# Patient Record
Sex: MALE | Race: WHITE | Employment: OTHER | URBAN - METROPOLITAN AREA
[De-identification: names, ages, dates, MRNs, and addresses within clinical notes are randomized per-mention and may not be internally consistent; named-entity substitution may affect disease eponyms.]

---

## 2018-01-01 ENCOUNTER — TELEPHONE (OUTPATIENT)
Dept: NEPHROLOGY | Facility: CLINIC | Age: 83
End: 2018-01-01

## 2018-12-07 ENCOUNTER — OFFICE VISIT (OUTPATIENT)
Dept: NEPHROLOGY | Facility: CLINIC | Age: 83
End: 2018-12-07
Payer: MEDICARE

## 2018-12-07 VITALS — BODY MASS INDEX: 30 KG/M2 | DIASTOLIC BLOOD PRESSURE: 60 MMHG | WEIGHT: 194 LBS | SYSTOLIC BLOOD PRESSURE: 140 MMHG

## 2018-12-07 DIAGNOSIS — D63.1 ANEMIA DUE TO STAGE 3 CHRONIC KIDNEY DISEASE (HCC): ICD-10-CM

## 2018-12-07 DIAGNOSIS — N18.30 CKD (CHRONIC KIDNEY DISEASE) STAGE 3, GFR 30-59 ML/MIN (HCC): Primary | ICD-10-CM

## 2018-12-07 DIAGNOSIS — I10 ESSENTIAL HYPERTENSION: ICD-10-CM

## 2018-12-07 DIAGNOSIS — E79.0 ELEVATED URIC ACID IN BLOOD: ICD-10-CM

## 2018-12-07 DIAGNOSIS — R60.9 EDEMA, UNSPECIFIED TYPE: ICD-10-CM

## 2018-12-07 DIAGNOSIS — N18.30 ANEMIA DUE TO STAGE 3 CHRONIC KIDNEY DISEASE (HCC): ICD-10-CM

## 2018-12-07 PROCEDURE — 99205 OFFICE O/P NEW HI 60 MIN: CPT | Performed by: INTERNAL MEDICINE

## 2018-12-07 RX ORDER — ALLOPURINOL 100 MG/1
100 TABLET ORAL DAILY
COMMUNITY
End: 2019-01-01

## 2018-12-07 RX ORDER — CHLORAL HYDRATE 500 MG
1000 CAPSULE ORAL DAILY
Status: ON HOLD | COMMUNITY
End: 2019-01-01

## 2018-12-07 NOTE — PROGRESS NOTES
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 He has smoked for many years but quit recently. Was seen a nephrologist in [place]. Has had a couple of gout episodes within the past 2-3 years but these are infrequent.   Has been started every 4 (four) hours as needed. Disp:  Rfl:    melatonin 3 MG Oral Tab Take 3 mg by mouth nightly. Disp:  Rfl:    metoprolol tartrate 12.5 mg Oral Tab Take 12.5 mg by mouth 2x Daily(Beta Blocker).  Disp:  Rfl:    insulin aspart 100 UNIT/ML Subcutaneous Solu

## 2019-01-01 ENCOUNTER — APPOINTMENT (OUTPATIENT)
Dept: MRI IMAGING | Facility: HOSPITAL | Age: 84
DRG: 291 | End: 2019-01-01
Attending: Other
Payer: MEDICARE

## 2019-01-01 ENCOUNTER — APPOINTMENT (OUTPATIENT)
Dept: CT IMAGING | Facility: HOSPITAL | Age: 84
DRG: 083 | End: 2019-01-01
Payer: MEDICARE

## 2019-01-01 ENCOUNTER — HOSPITAL ENCOUNTER (INPATIENT)
Facility: HOSPITAL | Age: 84
LOS: 3 days | Discharge: HOSPICE | DRG: 811 | End: 2019-01-01
Attending: EMERGENCY MEDICINE | Admitting: INTERNAL MEDICINE
Payer: MEDICARE

## 2019-01-01 ENCOUNTER — APPOINTMENT (OUTPATIENT)
Dept: GENERAL RADIOLOGY | Facility: HOSPITAL | Age: 84
DRG: 083 | End: 2019-01-01
Attending: ORTHOPAEDIC SURGERY
Payer: MEDICARE

## 2019-01-01 ENCOUNTER — APPOINTMENT (OUTPATIENT)
Dept: GENERAL RADIOLOGY | Facility: HOSPITAL | Age: 84
DRG: 811 | End: 2019-01-01
Attending: INTERNAL MEDICINE
Payer: MEDICARE

## 2019-01-01 ENCOUNTER — HOSPITAL ENCOUNTER (INPATIENT)
Facility: HOSPITAL | Age: 84
LOS: 8 days | Discharge: NURSING, REHAB OR LONG-TERM CARE | DRG: 291 | End: 2019-01-01
Attending: EMERGENCY MEDICINE | Admitting: FAMILY MEDICINE
Payer: MEDICARE

## 2019-01-01 ENCOUNTER — APPOINTMENT (OUTPATIENT)
Dept: GENERAL RADIOLOGY | Facility: HOSPITAL | Age: 84
DRG: 083 | End: 2019-01-01
Attending: INTERNAL MEDICINE
Payer: MEDICARE

## 2019-01-01 ENCOUNTER — HOSPITAL ENCOUNTER (OUTPATIENT)
Facility: HOSPITAL | Age: 84
Setting detail: OBSERVATION
Discharge: NURSING, REHAB OR LONG-TERM CARE | End: 2019-01-01
Attending: EMERGENCY MEDICINE | Admitting: FAMILY MEDICINE
Payer: MEDICARE

## 2019-01-01 ENCOUNTER — APPOINTMENT (OUTPATIENT)
Dept: GENERAL RADIOLOGY | Facility: HOSPITAL | Age: 84
DRG: 291 | End: 2019-01-01
Attending: EMERGENCY MEDICINE
Payer: MEDICARE

## 2019-01-01 ENCOUNTER — APPOINTMENT (OUTPATIENT)
Dept: CT IMAGING | Facility: HOSPITAL | Age: 84
DRG: 083 | End: 2019-01-01
Attending: NURSE PRACTITIONER
Payer: MEDICARE

## 2019-01-01 ENCOUNTER — APPOINTMENT (OUTPATIENT)
Dept: GENERAL RADIOLOGY | Facility: HOSPITAL | Age: 84
End: 2019-01-01
Attending: EMERGENCY MEDICINE
Payer: MEDICARE

## 2019-01-01 ENCOUNTER — HOSPITAL ENCOUNTER (INPATIENT)
Facility: HOSPITAL | Age: 84
LOS: 7 days | Discharge: NURSING, REHAB OR LONG-TERM CARE | DRG: 083 | End: 2019-01-01
Admitting: FAMILY MEDICINE
Payer: MEDICARE

## 2019-01-01 ENCOUNTER — APPOINTMENT (OUTPATIENT)
Dept: GENERAL RADIOLOGY | Facility: HOSPITAL | Age: 84
DRG: 083 | End: 2019-01-01
Payer: MEDICARE

## 2019-01-01 ENCOUNTER — TELEPHONE (OUTPATIENT)
Dept: NEUROLOGY | Facility: CLINIC | Age: 84
End: 2019-01-01

## 2019-01-01 ENCOUNTER — APPOINTMENT (OUTPATIENT)
Dept: GENERAL RADIOLOGY | Facility: HOSPITAL | Age: 84
DRG: 291 | End: 2019-01-01
Attending: INTERNAL MEDICINE
Payer: MEDICARE

## 2019-01-01 ENCOUNTER — HOSPITAL ENCOUNTER (INPATIENT)
Facility: HOSPITAL | Age: 84
LOS: 1 days | DRG: 375 | End: 2019-01-01
Attending: INTERNAL MEDICINE | Admitting: INTERNAL MEDICINE
Payer: OTHER MISCELLANEOUS

## 2019-01-01 ENCOUNTER — OFFICE VISIT (OUTPATIENT)
Dept: NEPHROLOGY | Facility: CLINIC | Age: 84
End: 2019-01-01
Payer: MEDICARE

## 2019-01-01 ENCOUNTER — APPOINTMENT (OUTPATIENT)
Dept: CT IMAGING | Facility: HOSPITAL | Age: 84
DRG: 291 | End: 2019-01-01
Attending: EMERGENCY MEDICINE
Payer: MEDICARE

## 2019-01-01 ENCOUNTER — APPOINTMENT (OUTPATIENT)
Dept: CV DIAGNOSTICS | Facility: HOSPITAL | Age: 84
End: 2019-01-01
Attending: INTERNAL MEDICINE
Payer: MEDICARE

## 2019-01-01 ENCOUNTER — APPOINTMENT (OUTPATIENT)
Dept: GENERAL RADIOLOGY | Facility: HOSPITAL | Age: 84
DRG: 811 | End: 2019-01-01
Attending: NURSE PRACTITIONER
Payer: MEDICARE

## 2019-01-01 ENCOUNTER — TELEPHONE (OUTPATIENT)
Dept: SURGERY | Facility: CLINIC | Age: 84
End: 2019-01-01

## 2019-01-01 ENCOUNTER — APPOINTMENT (OUTPATIENT)
Dept: GENERAL RADIOLOGY | Facility: HOSPITAL | Age: 84
DRG: 083 | End: 2019-01-01
Attending: FAMILY MEDICINE
Payer: MEDICARE

## 2019-01-01 ENCOUNTER — APPOINTMENT (OUTPATIENT)
Dept: GENERAL RADIOLOGY | Facility: HOSPITAL | Age: 84
DRG: 291 | End: 2019-01-01
Attending: FAMILY MEDICINE
Payer: MEDICARE

## 2019-01-01 ENCOUNTER — APPOINTMENT (OUTPATIENT)
Dept: ULTRASOUND IMAGING | Facility: HOSPITAL | Age: 84
DRG: 083 | End: 2019-01-01
Attending: INTERNAL MEDICINE
Payer: MEDICARE

## 2019-01-01 ENCOUNTER — APPOINTMENT (OUTPATIENT)
Dept: CV DIAGNOSTICS | Facility: HOSPITAL | Age: 84
DRG: 083 | End: 2019-01-01
Attending: INTERNAL MEDICINE
Payer: MEDICARE

## 2019-01-01 VITALS — WEIGHT: 202 LBS | DIASTOLIC BLOOD PRESSURE: 86 MMHG | SYSTOLIC BLOOD PRESSURE: 120 MMHG | BODY MASS INDEX: 31 KG/M2

## 2019-01-01 VITALS
RESPIRATION RATE: 22 BRPM | DIASTOLIC BLOOD PRESSURE: 54 MMHG | SYSTOLIC BLOOD PRESSURE: 139 MMHG | BODY MASS INDEX: 28.5 KG/M2 | HEART RATE: 93 BPM | HEIGHT: 68 IN | OXYGEN SATURATION: 96 % | WEIGHT: 188.06 LBS | TEMPERATURE: 99 F

## 2019-01-01 VITALS
TEMPERATURE: 99 F | HEART RATE: 95 BPM | OXYGEN SATURATION: 96 % | RESPIRATION RATE: 22 BRPM | SYSTOLIC BLOOD PRESSURE: 147 MMHG | DIASTOLIC BLOOD PRESSURE: 56 MMHG

## 2019-01-01 VITALS
WEIGHT: 196.19 LBS | RESPIRATION RATE: 18 BRPM | HEART RATE: 62 BPM | OXYGEN SATURATION: 98 % | TEMPERATURE: 98 F | BODY MASS INDEX: 30 KG/M2 | SYSTOLIC BLOOD PRESSURE: 164 MMHG | DIASTOLIC BLOOD PRESSURE: 62 MMHG

## 2019-01-01 VITALS
WEIGHT: 203.19 LBS | RESPIRATION RATE: 26 BRPM | TEMPERATURE: 98 F | DIASTOLIC BLOOD PRESSURE: 64 MMHG | OXYGEN SATURATION: 94 % | BODY MASS INDEX: 31 KG/M2 | SYSTOLIC BLOOD PRESSURE: 168 MMHG | HEART RATE: 63 BPM

## 2019-01-01 VITALS
SYSTOLIC BLOOD PRESSURE: 160 MMHG | WEIGHT: 184.06 LBS | TEMPERATURE: 98 F | HEIGHT: 68 IN | HEART RATE: 87 BPM | RESPIRATION RATE: 20 BRPM | OXYGEN SATURATION: 98 % | BODY MASS INDEX: 27.9 KG/M2 | DIASTOLIC BLOOD PRESSURE: 56 MMHG

## 2019-01-01 DIAGNOSIS — R77.8 ELEVATED TROPONIN: ICD-10-CM

## 2019-01-01 DIAGNOSIS — I50.9 ACUTE ON CHRONIC CONGESTIVE HEART FAILURE, UNSPECIFIED HEART FAILURE TYPE (HCC): Primary | ICD-10-CM

## 2019-01-01 DIAGNOSIS — D64.9 ANEMIA, UNSPECIFIED TYPE: ICD-10-CM

## 2019-01-01 DIAGNOSIS — I50.9 ACUTE ON CHRONIC CONGESTIVE HEART FAILURE, UNSPECIFIED HEART FAILURE TYPE (HCC): ICD-10-CM

## 2019-01-01 DIAGNOSIS — N18.30 CKD (CHRONIC KIDNEY DISEASE) STAGE 3, GFR 30-59 ML/MIN (HCC): Primary | ICD-10-CM

## 2019-01-01 DIAGNOSIS — S06.369A TRAUMATIC HEMORRHAGE OF CEREBRUM WITH LOSS OF CONSCIOUSNESS, UNSPECIFIED LATERALITY, INITIAL ENCOUNTER (HCC): Primary | ICD-10-CM

## 2019-01-01 DIAGNOSIS — D63.1 ANEMIA DUE TO STAGE 3 CHRONIC KIDNEY DISEASE (HCC): ICD-10-CM

## 2019-01-01 DIAGNOSIS — K92.1 MELENA: Primary | ICD-10-CM

## 2019-01-01 DIAGNOSIS — I10 ESSENTIAL HYPERTENSION: ICD-10-CM

## 2019-01-01 DIAGNOSIS — R55 SYNCOPE, VASOVAGAL: ICD-10-CM

## 2019-01-01 DIAGNOSIS — N18.30 ANEMIA DUE TO STAGE 3 CHRONIC KIDNEY DISEASE (HCC): ICD-10-CM

## 2019-01-01 DIAGNOSIS — I10 HYPERTENSION, UNSPECIFIED TYPE: ICD-10-CM

## 2019-01-01 DIAGNOSIS — I50.9 ACUTE CONGESTIVE HEART FAILURE, UNSPECIFIED HEART FAILURE TYPE (HCC): Primary | ICD-10-CM

## 2019-01-01 DIAGNOSIS — R60.9 EDEMA, UNSPECIFIED TYPE: ICD-10-CM

## 2019-01-01 PROCEDURE — 99291 CRITICAL CARE FIRST HOUR: CPT | Performed by: NURSE PRACTITIONER

## 2019-01-01 PROCEDURE — 99223 1ST HOSP IP/OBS HIGH 75: CPT | Performed by: INTERNAL MEDICINE

## 2019-01-01 PROCEDURE — 71045 X-RAY EXAM CHEST 1 VIEW: CPT | Performed by: EMERGENCY MEDICINE

## 2019-01-01 PROCEDURE — 96376 TX/PRO/DX INJ SAME DRUG ADON: CPT

## 2019-01-01 PROCEDURE — 85025 COMPLETE CBC W/AUTO DIFF WBC: CPT | Performed by: EMERGENCY MEDICINE

## 2019-01-01 PROCEDURE — 71045 X-RAY EXAM CHEST 1 VIEW: CPT | Performed by: FAMILY MEDICINE

## 2019-01-01 PROCEDURE — 99232 SBSQ HOSP IP/OBS MODERATE 35: CPT | Performed by: INTERNAL MEDICINE

## 2019-01-01 PROCEDURE — 93017 CV STRESS TEST TRACING ONLY: CPT | Performed by: INTERNAL MEDICINE

## 2019-01-01 PROCEDURE — 93306 TTE W/DOPPLER COMPLETE: CPT | Performed by: INTERNAL MEDICINE

## 2019-01-01 PROCEDURE — 80048 BASIC METABOLIC PNL TOTAL CA: CPT | Performed by: PHYSICIAN ASSISTANT

## 2019-01-01 PROCEDURE — 71045 X-RAY EXAM CHEST 1 VIEW: CPT | Performed by: INTERNAL MEDICINE

## 2019-01-01 PROCEDURE — 70551 MRI BRAIN STEM W/O DYE: CPT | Performed by: OTHER

## 2019-01-01 PROCEDURE — G0463 HOSPITAL OUTPT CLINIC VISIT: HCPCS

## 2019-01-01 PROCEDURE — 30233H1 TRANSFUSION OF NONAUTOLOGOUS WHOLE BLOOD INTO PERIPHERAL VEIN, PERCUTANEOUS APPROACH: ICD-10-PCS | Performed by: FAMILY MEDICINE

## 2019-01-01 PROCEDURE — 93010 ELECTROCARDIOGRAM REPORT: CPT

## 2019-01-01 PROCEDURE — 99233 SBSQ HOSP IP/OBS HIGH 50: CPT | Performed by: HOSPITALIST

## 2019-01-01 PROCEDURE — 74176 CT ABD & PELVIS W/O CONTRAST: CPT | Performed by: EMERGENCY MEDICINE

## 2019-01-01 PROCEDURE — 97535 SELF CARE MNGMENT TRAINING: CPT

## 2019-01-01 PROCEDURE — 93880 EXTRACRANIAL BILAT STUDY: CPT | Performed by: INTERNAL MEDICINE

## 2019-01-01 PROCEDURE — 82962 GLUCOSE BLOOD TEST: CPT

## 2019-01-01 PROCEDURE — 80053 COMPREHEN METABOLIC PANEL: CPT | Performed by: EMERGENCY MEDICINE

## 2019-01-01 PROCEDURE — 84484 ASSAY OF TROPONIN QUANT: CPT | Performed by: EMERGENCY MEDICINE

## 2019-01-01 PROCEDURE — 85025 COMPLETE CBC W/AUTO DIFF WBC: CPT | Performed by: FAMILY MEDICINE

## 2019-01-01 PROCEDURE — 80048 BASIC METABOLIC PNL TOTAL CA: CPT | Performed by: INTERNAL MEDICINE

## 2019-01-01 PROCEDURE — 83735 ASSAY OF MAGNESIUM: CPT | Performed by: PHYSICIAN ASSISTANT

## 2019-01-01 PROCEDURE — 99285 EMERGENCY DEPT VISIT HI MDM: CPT

## 2019-01-01 PROCEDURE — 3E0234Z INTRODUCTION OF SERUM, TOXOID AND VACCINE INTO MUSCLE, PERCUTANEOUS APPROACH: ICD-10-PCS

## 2019-01-01 PROCEDURE — 97530 THERAPEUTIC ACTIVITIES: CPT

## 2019-01-01 PROCEDURE — 71045 X-RAY EXAM CHEST 1 VIEW: CPT | Performed by: NURSE PRACTITIONER

## 2019-01-01 PROCEDURE — 99221 1ST HOSP IP/OBS SF/LOW 40: CPT | Performed by: NEUROLOGICAL SURGERY

## 2019-01-01 PROCEDURE — 96374 THER/PROPH/DIAG INJ IV PUSH: CPT

## 2019-01-01 PROCEDURE — 73030 X-RAY EXAM OF SHOULDER: CPT | Performed by: ORTHOPAEDIC SURGERY

## 2019-01-01 PROCEDURE — 72125 CT NECK SPINE W/O DYE: CPT

## 2019-01-01 PROCEDURE — 97165 OT EVAL LOW COMPLEX 30 MIN: CPT

## 2019-01-01 PROCEDURE — 83735 ASSAY OF MAGNESIUM: CPT | Performed by: EMERGENCY MEDICINE

## 2019-01-01 PROCEDURE — 99291 CRITICAL CARE FIRST HOUR: CPT | Performed by: OTHER

## 2019-01-01 PROCEDURE — 71045 X-RAY EXAM CHEST 1 VIEW: CPT

## 2019-01-01 PROCEDURE — 70450 CT HEAD/BRAIN W/O DYE: CPT

## 2019-01-01 PROCEDURE — 93018 CV STRESS TEST I&R ONLY: CPT | Performed by: INTERNAL MEDICINE

## 2019-01-01 PROCEDURE — 99232 SBSQ HOSP IP/OBS MODERATE 35: CPT | Performed by: OTHER

## 2019-01-01 PROCEDURE — 99223 1ST HOSP IP/OBS HIGH 75: CPT | Performed by: OTHER

## 2019-01-01 PROCEDURE — 99223 1ST HOSP IP/OBS HIGH 75: CPT | Performed by: NURSE PRACTITIONER

## 2019-01-01 PROCEDURE — 99233 SBSQ HOSP IP/OBS HIGH 50: CPT | Performed by: OTHER

## 2019-01-01 PROCEDURE — 99222 1ST HOSP IP/OBS MODERATE 55: CPT | Performed by: NURSE PRACTITIONER

## 2019-01-01 PROCEDURE — 99214 OFFICE O/P EST MOD 30 MIN: CPT | Performed by: INTERNAL MEDICINE

## 2019-01-01 PROCEDURE — 30233R1 TRANSFUSION OF NONAUTOLOGOUS PLATELETS INTO PERIPHERAL VEIN, PERCUTANEOUS APPROACH: ICD-10-PCS | Performed by: FAMILY MEDICINE

## 2019-01-01 PROCEDURE — 83880 ASSAY OF NATRIURETIC PEPTIDE: CPT | Performed by: EMERGENCY MEDICINE

## 2019-01-01 PROCEDURE — 93005 ELECTROCARDIOGRAM TRACING: CPT

## 2019-01-01 PROCEDURE — 97161 PT EVAL LOW COMPLEX 20 MIN: CPT

## 2019-01-01 PROCEDURE — 78452 HT MUSCLE IMAGE SPECT MULT: CPT | Performed by: INTERNAL MEDICINE

## 2019-01-01 PROCEDURE — 70450 CT HEAD/BRAIN W/O DYE: CPT | Performed by: NURSE PRACTITIONER

## 2019-01-01 PROCEDURE — 30233N1 TRANSFUSION OF NONAUTOLOGOUS RED BLOOD CELLS INTO PERIPHERAL VEIN, PERCUTANEOUS APPROACH: ICD-10-PCS | Performed by: INTERNAL MEDICINE

## 2019-01-01 PROCEDURE — 99233 SBSQ HOSP IP/OBS HIGH 50: CPT | Performed by: NURSE PRACTITIONER

## 2019-01-01 PROCEDURE — 96375 TX/PRO/DX INJ NEW DRUG ADDON: CPT

## 2019-01-01 PROCEDURE — 95819 EEG AWAKE AND ASLEEP: CPT | Performed by: OTHER

## 2019-01-01 RX ORDER — HYDRALAZINE HYDROCHLORIDE 20 MG/ML
10 INJECTION INTRAMUSCULAR; INTRAVENOUS EVERY 4 HOURS PRN
Status: DISCONTINUED | OUTPATIENT
Start: 2019-01-01 | End: 2019-01-01

## 2019-01-01 RX ORDER — HYDRALAZINE HYDROCHLORIDE 50 MG/1
50 TABLET, FILM COATED ORAL EVERY 6 HOURS SCHEDULED
Qty: 120 TABLET | Refills: 11 | Status: ON HOLD | OUTPATIENT
Start: 2019-01-01 | End: 2019-01-01

## 2019-01-01 RX ORDER — DOCUSATE SODIUM 100 MG/1
100 CAPSULE, LIQUID FILLED ORAL 2 TIMES DAILY
Status: DISCONTINUED | OUTPATIENT
Start: 2019-01-01 | End: 2019-01-01

## 2019-01-01 RX ORDER — LORAZEPAM 2 MG/ML
1 INJECTION INTRAMUSCULAR EVERY 4 HOURS PRN
Status: DISCONTINUED | OUTPATIENT
Start: 2019-01-01 | End: 2019-01-01

## 2019-01-01 RX ORDER — FUROSEMIDE 10 MG/ML
40 INJECTION INTRAMUSCULAR; INTRAVENOUS EVERY 8 HOURS PRN
Status: DISCONTINUED | OUTPATIENT
Start: 2019-01-01 | End: 2019-01-01

## 2019-01-01 RX ORDER — FUROSEMIDE 10 MG/ML
20 INJECTION INTRAMUSCULAR; INTRAVENOUS ONCE
Status: COMPLETED | OUTPATIENT
Start: 2019-01-01 | End: 2019-01-01

## 2019-01-01 RX ORDER — METOPROLOL SUCCINATE 50 MG/1
50 TABLET, EXTENDED RELEASE ORAL
Status: DISCONTINUED | OUTPATIENT
Start: 2019-01-01 | End: 2019-01-01

## 2019-01-01 RX ORDER — ATORVASTATIN CALCIUM 40 MG/1
40 TABLET, FILM COATED ORAL NIGHTLY
Qty: 90 TABLET | Refills: 3 | Status: ON HOLD | OUTPATIENT
Start: 2019-01-01 | End: 2019-01-01

## 2019-01-01 RX ORDER — ISOSORBIDE MONONITRATE 30 MG/1
30 TABLET, EXTENDED RELEASE ORAL DAILY
Status: DISCONTINUED | OUTPATIENT
Start: 2019-01-01 | End: 2019-01-01

## 2019-01-01 RX ORDER — BISACODYL 10 MG
10 SUPPOSITORY, RECTAL RECTAL
Status: DISCONTINUED | OUTPATIENT
Start: 2019-01-01 | End: 2019-01-01

## 2019-01-01 RX ORDER — LORAZEPAM 2 MG/ML
1 INJECTION INTRAMUSCULAR
Status: DISCONTINUED | OUTPATIENT
Start: 2019-01-01 | End: 2019-01-01

## 2019-01-01 RX ORDER — MELATONIN
325 2 TIMES DAILY WITH MEALS
Status: DISCONTINUED | OUTPATIENT
Start: 2019-01-01 | End: 2019-01-01

## 2019-01-01 RX ORDER — METHYLPREDNISOLONE SODIUM SUCCINATE 125 MG/2ML
60 INJECTION, POWDER, LYOPHILIZED, FOR SOLUTION INTRAMUSCULAR; INTRAVENOUS EVERY 8 HOURS
Status: DISCONTINUED | OUTPATIENT
Start: 2019-01-01 | End: 2019-01-01

## 2019-01-01 RX ORDER — ASPIRIN 81 MG/1
81 TABLET ORAL DAILY
Status: DISCONTINUED | OUTPATIENT
Start: 2019-01-01 | End: 2019-01-01

## 2019-01-01 RX ORDER — TORSEMIDE 20 MG/1
40 TABLET ORAL
Status: DISCONTINUED | OUTPATIENT
Start: 2019-01-01 | End: 2019-01-01

## 2019-01-01 RX ORDER — HYDRALAZINE HYDROCHLORIDE 50 MG/1
50 TABLET, FILM COATED ORAL EVERY 8 HOURS SCHEDULED
Status: DISCONTINUED | OUTPATIENT
Start: 2019-01-01 | End: 2019-01-01

## 2019-01-01 RX ORDER — ACETAMINOPHEN 325 MG/1
650 TABLET ORAL EVERY 4 HOURS PRN
Refills: 0 | Status: ON HOLD | COMMUNITY
Start: 2019-01-01 | End: 2019-01-01

## 2019-01-01 RX ORDER — ACETAMINOPHEN 500 MG
1000 TABLET ORAL EVERY 6 HOURS PRN
Status: DISCONTINUED | OUTPATIENT
Start: 2019-01-01 | End: 2019-01-01

## 2019-01-01 RX ORDER — MELATONIN
325
Status: DISCONTINUED | OUTPATIENT
Start: 2019-01-01 | End: 2019-01-01

## 2019-01-01 RX ORDER — ACETAMINOPHEN 325 MG/1
650 TABLET ORAL EVERY 4 HOURS PRN
Status: DISCONTINUED | OUTPATIENT
Start: 2019-01-01 | End: 2019-01-01

## 2019-01-01 RX ORDER — ASPIRIN 81 MG/1
81 TABLET ORAL DAILY
Qty: 30 TABLET | Refills: 0 | Status: ON HOLD | OUTPATIENT
Start: 2019-01-01 | End: 2019-01-01

## 2019-01-01 RX ORDER — FUROSEMIDE 10 MG/ML
40 INJECTION INTRAMUSCULAR; INTRAVENOUS ONCE
Status: COMPLETED | OUTPATIENT
Start: 2019-01-01 | End: 2019-01-01

## 2019-01-01 RX ORDER — METOCLOPRAMIDE HYDROCHLORIDE 5 MG/ML
5 INJECTION INTRAMUSCULAR; INTRAVENOUS EVERY 8 HOURS PRN
Status: DISCONTINUED | OUTPATIENT
Start: 2019-01-01 | End: 2019-01-01

## 2019-01-01 RX ORDER — SODIUM CHLORIDE 9 MG/ML
INJECTION, SOLUTION INTRAVENOUS CONTINUOUS
Status: DISCONTINUED | OUTPATIENT
Start: 2019-01-01 | End: 2019-01-01

## 2019-01-01 RX ORDER — SCOLOPAMINE TRANSDERMAL SYSTEM 1 MG/1
1 PATCH, EXTENDED RELEASE TRANSDERMAL
Status: DISCONTINUED | OUTPATIENT
Start: 2019-01-01 | End: 2019-01-01

## 2019-01-01 RX ORDER — TORSEMIDE 20 MG/1
20 TABLET ORAL
Status: DISCONTINUED | OUTPATIENT
Start: 2019-01-01 | End: 2019-01-01

## 2019-01-01 RX ORDER — IPRATROPIUM BROMIDE AND ALBUTEROL SULFATE 2.5; .5 MG/3ML; MG/3ML
3 SOLUTION RESPIRATORY (INHALATION)
Status: DISCONTINUED | OUTPATIENT
Start: 2019-01-01 | End: 2019-01-01

## 2019-01-01 RX ORDER — SENNA AND DOCUSATE SODIUM 50; 8.6 MG/1; MG/1
2 TABLET, FILM COATED ORAL DAILY
Status: DISCONTINUED | OUTPATIENT
Start: 2019-01-01 | End: 2019-01-01

## 2019-01-01 RX ORDER — FUROSEMIDE 10 MG/ML
40 INJECTION INTRAMUSCULAR; INTRAVENOUS
Status: DISCONTINUED | OUTPATIENT
Start: 2019-01-01 | End: 2019-01-01

## 2019-01-01 RX ORDER — FUROSEMIDE 40 MG/1
40 TABLET ORAL
Status: DISCONTINUED | OUTPATIENT
Start: 2019-01-01 | End: 2019-01-01

## 2019-01-01 RX ORDER — IPRATROPIUM BROMIDE AND ALBUTEROL SULFATE 2.5; .5 MG/3ML; MG/3ML
3 SOLUTION RESPIRATORY (INHALATION) EVERY 4 HOURS PRN
Refills: 0 | Status: SHIPPED | COMMUNITY
Start: 2019-01-01 | End: 2019-01-01

## 2019-01-01 RX ORDER — ACETAMINOPHEN 325 MG/1
650 TABLET ORAL EVERY 6 HOURS PRN
Status: DISCONTINUED | OUTPATIENT
Start: 2019-01-01 | End: 2019-01-01

## 2019-01-01 RX ORDER — TIZANIDINE 2 MG/1
2 TABLET ORAL EVERY 6 HOURS PRN
Status: DISCONTINUED | OUTPATIENT
Start: 2019-01-01 | End: 2019-01-01

## 2019-01-01 RX ORDER — CHLORAL HYDRATE 500 MG
1000 CAPSULE ORAL DAILY
Status: DISCONTINUED | OUTPATIENT
Start: 2019-01-01 | End: 2019-01-01 | Stop reason: RX

## 2019-01-01 RX ORDER — IPRATROPIUM BROMIDE AND ALBUTEROL SULFATE 2.5; .5 MG/3ML; MG/3ML
3 SOLUTION RESPIRATORY (INHALATION)
Refills: 0 | Status: ON HOLD | COMMUNITY
Start: 2019-01-01 | End: 2019-01-01

## 2019-01-01 RX ORDER — PHENYLEPHRINE HCL IN 0.9% NACL 50MG/250ML
PLASTIC BAG, INJECTION (ML) INTRAVENOUS CONTINUOUS PRN
Status: DISCONTINUED | OUTPATIENT
Start: 2019-01-01 | End: 2019-01-01 | Stop reason: HOSPADM

## 2019-01-01 RX ORDER — DEXTROSE MONOHYDRATE 25 G/50ML
50 INJECTION, SOLUTION INTRAVENOUS
Status: DISCONTINUED | OUTPATIENT
Start: 2019-01-01 | End: 2019-01-01

## 2019-01-01 RX ORDER — MORPHINE SULFATE 4 MG/ML
2 INJECTION, SOLUTION INTRAMUSCULAR; INTRAVENOUS EVERY 2 HOUR PRN
Status: DISCONTINUED | OUTPATIENT
Start: 2019-01-01 | End: 2019-01-01

## 2019-01-01 RX ORDER — IPRATROPIUM BROMIDE AND ALBUTEROL SULFATE 2.5; .5 MG/3ML; MG/3ML
3 SOLUTION RESPIRATORY (INHALATION) EVERY 4 HOURS PRN
Status: DISCONTINUED | OUTPATIENT
Start: 2019-01-01 | End: 2019-01-01

## 2019-01-01 RX ORDER — ONDANSETRON 2 MG/ML
4 INJECTION INTRAMUSCULAR; INTRAVENOUS EVERY 6 HOURS PRN
Status: DISCONTINUED | OUTPATIENT
Start: 2019-01-01 | End: 2019-01-01

## 2019-01-01 RX ORDER — GABAPENTIN 300 MG/1
300 CAPSULE ORAL 2 TIMES DAILY
Status: ON HOLD | COMMUNITY
End: 2019-01-01

## 2019-01-01 RX ORDER — ATORVASTATIN CALCIUM 40 MG/1
40 TABLET, FILM COATED ORAL NIGHTLY
Status: DISCONTINUED | OUTPATIENT
Start: 2019-01-01 | End: 2019-01-01

## 2019-01-01 RX ORDER — HYDROMORPHONE HYDROCHLORIDE 1 MG/ML
0.5 INJECTION, SOLUTION INTRAMUSCULAR; INTRAVENOUS; SUBCUTANEOUS EVERY 2 HOUR PRN
Status: DISCONTINUED | OUTPATIENT
Start: 2019-01-01 | End: 2019-01-01

## 2019-01-01 RX ORDER — OXYCODONE HYDROCHLORIDE 5 MG/1
5 TABLET ORAL EVERY 4 HOURS PRN
Status: DISCONTINUED | OUTPATIENT
Start: 2019-01-01 | End: 2019-01-01

## 2019-01-01 RX ORDER — GLYCOPYRROLATE 0.2 MG/ML
0.4 INJECTION, SOLUTION INTRAMUSCULAR; INTRAVENOUS
Status: DISCONTINUED | OUTPATIENT
Start: 2019-01-01 | End: 2019-01-01

## 2019-01-01 RX ORDER — PREDNISONE 10 MG/1
TABLET ORAL
Qty: 30 TABLET | Refills: 0 | Status: SHIPPED | OUTPATIENT
Start: 2019-01-01 | End: 2019-01-01

## 2019-01-01 RX ORDER — HYDRALAZINE HYDROCHLORIDE 10 MG/1
10 TABLET, FILM COATED ORAL EVERY 8 HOURS SCHEDULED
Status: DISCONTINUED | OUTPATIENT
Start: 2019-01-01 | End: 2019-01-01

## 2019-01-01 RX ORDER — ACETAMINOPHEN 650 MG/1
650 SUPPOSITORY RECTAL EVERY 4 HOURS PRN
Status: DISCONTINUED | OUTPATIENT
Start: 2019-01-01 | End: 2019-01-01

## 2019-01-01 RX ORDER — PREDNISONE 20 MG/1
40 TABLET ORAL
Refills: 0 | Status: SHIPPED | COMMUNITY
Start: 2019-01-01 | End: 2019-01-01

## 2019-01-01 RX ORDER — ACETAMINOPHEN 160 MG/5ML
650 SOLUTION ORAL EVERY 4 HOURS PRN
Status: DISCONTINUED | OUTPATIENT
Start: 2019-01-01 | End: 2019-01-01

## 2019-01-01 RX ORDER — PREDNISONE 10 MG/1
TABLET ORAL
Qty: 18 TABLET | Refills: 0 | Status: ON HOLD | OUTPATIENT
Start: 2019-01-01 | End: 2019-01-01

## 2019-01-01 RX ORDER — HYDRALAZINE HYDROCHLORIDE 20 MG/ML
INJECTION INTRAMUSCULAR; INTRAVENOUS
Status: COMPLETED
Start: 2019-01-01 | End: 2019-01-01

## 2019-01-01 RX ORDER — CLOPIDOGREL BISULFATE 75 MG/1
75 TABLET ORAL DAILY
Status: DISCONTINUED | OUTPATIENT
Start: 2019-01-01 | End: 2019-01-01

## 2019-01-01 RX ORDER — BUMETANIDE 0.25 MG/ML
1 INJECTION, SOLUTION INTRAMUSCULAR; INTRAVENOUS ONCE
Status: COMPLETED | OUTPATIENT
Start: 2019-01-01 | End: 2019-01-01

## 2019-01-01 RX ORDER — POLYETHYLENE GLYCOL 3350 17 G/17G
17 POWDER, FOR SOLUTION ORAL DAILY
Status: DISCONTINUED | OUTPATIENT
Start: 2019-01-01 | End: 2019-01-01

## 2019-01-01 RX ORDER — CLOPIDOGREL BISULFATE 75 MG/1
75 TABLET ORAL
Status: ON HOLD | COMMUNITY
End: 2019-01-01

## 2019-01-01 RX ORDER — PEPPERMINT OIL
10 OIL (ML) MISCELLANEOUS AS NEEDED
Status: DISCONTINUED | OUTPATIENT
Start: 2019-01-01 | End: 2019-01-01

## 2019-01-01 RX ORDER — PREDNISONE 20 MG/1
40 TABLET ORAL
Status: DISCONTINUED | OUTPATIENT
Start: 2019-01-01 | End: 2019-01-01

## 2019-01-01 RX ORDER — METOPROLOL SUCCINATE 50 MG/1
50 TABLET, EXTENDED RELEASE ORAL
Refills: 0 | Status: ON HOLD | COMMUNITY
Start: 2019-01-01 | End: 2019-01-01

## 2019-01-01 RX ORDER — FUROSEMIDE 40 MG/1
40 TABLET ORAL DAILY
Status: DISCONTINUED | OUTPATIENT
Start: 2019-01-01 | End: 2019-01-01

## 2019-01-01 RX ORDER — LABETALOL HYDROCHLORIDE 5 MG/ML
10 INJECTION, SOLUTION INTRAVENOUS EVERY 4 HOURS PRN
Status: DISCONTINUED | OUTPATIENT
Start: 2019-01-01 | End: 2019-01-01

## 2019-01-01 RX ORDER — HYDRALAZINE HYDROCHLORIDE 50 MG/1
50 TABLET, FILM COATED ORAL 2 TIMES DAILY
Status: DISCONTINUED | OUTPATIENT
Start: 2019-01-01 | End: 2019-01-01

## 2019-01-01 RX ORDER — ATROPINE SULFATE 10 MG/ML
2 SOLUTION/ DROPS OPHTHALMIC EVERY 2 HOUR PRN
Status: DISCONTINUED | OUTPATIENT
Start: 2019-01-01 | End: 2019-01-01

## 2019-01-01 RX ORDER — HYDRALAZINE HYDROCHLORIDE 50 MG/1
50 TABLET, FILM COATED ORAL 3 TIMES DAILY
Status: DISCONTINUED | OUTPATIENT
Start: 2019-01-01 | End: 2019-01-01

## 2019-01-01 RX ORDER — HYDRALAZINE HYDROCHLORIDE 50 MG/1
50 TABLET, FILM COATED ORAL EVERY 6 HOURS SCHEDULED
Status: DISCONTINUED | OUTPATIENT
Start: 2019-01-01 | End: 2019-01-01

## 2019-01-01 RX ORDER — AMLODIPINE BESYLATE 5 MG/1
5 TABLET ORAL DAILY
Status: DISCONTINUED | OUTPATIENT
Start: 2019-01-01 | End: 2019-01-01

## 2019-01-01 RX ORDER — POTASSIUM CHLORIDE 20 MEQ/1
20 TABLET, EXTENDED RELEASE ORAL 2 TIMES DAILY
Status: DISCONTINUED | OUTPATIENT
Start: 2019-01-01 | End: 2019-01-01

## 2019-01-01 RX ORDER — ATORVASTATIN CALCIUM 20 MG/1
20 TABLET, FILM COATED ORAL NIGHTLY
Status: DISCONTINUED | OUTPATIENT
Start: 2019-01-01 | End: 2019-01-01

## 2019-01-01 RX ORDER — ACETAMINOPHEN 650 MG/1
650 SUPPOSITORY RECTAL EVERY 6 HOURS SCHEDULED
Status: DISCONTINUED | OUTPATIENT
Start: 2019-01-01 | End: 2019-01-01

## 2019-01-01 RX ORDER — HALOPERIDOL 5 MG/ML
2 INJECTION INTRAMUSCULAR
Status: DISCONTINUED | OUTPATIENT
Start: 2019-01-01 | End: 2019-01-01

## 2019-01-01 RX ORDER — HYDROCODONE BITARTRATE AND ACETAMINOPHEN 5; 325 MG/1; MG/1
1 TABLET ORAL EVERY 6 HOURS PRN
Status: DISCONTINUED | OUTPATIENT
Start: 2019-01-01 | End: 2019-01-01

## 2019-01-01 RX ORDER — ENOXAPARIN SODIUM 100 MG/ML
30 INJECTION SUBCUTANEOUS DAILY
Status: DISCONTINUED | OUTPATIENT
Start: 2019-01-01 | End: 2019-01-01 | Stop reason: SDUPTHER

## 2019-01-01 RX ORDER — HALOPERIDOL 5 MG/ML
1 INJECTION INTRAMUSCULAR
Status: DISCONTINUED | OUTPATIENT
Start: 2019-01-01 | End: 2019-01-01

## 2019-01-01 RX ORDER — MAGNESIUM HYDROXIDE/ALUMINUM HYDROXICE/SIMETHICONE 120; 1200; 1200 MG/30ML; MG/30ML; MG/30ML
30 SUSPENSION ORAL 4 TIMES DAILY PRN
Status: DISCONTINUED | OUTPATIENT
Start: 2019-01-01 | End: 2019-01-01

## 2019-01-01 RX ORDER — CLOPIDOGREL BISULFATE 75 MG/1
75 TABLET ORAL DAILY
Qty: 90 TABLET | Refills: 1 | Status: ON HOLD | OUTPATIENT
Start: 2019-01-01 | End: 2019-01-01

## 2019-01-01 RX ORDER — MORPHINE SULFATE 4 MG/ML
1 INJECTION, SOLUTION INTRAMUSCULAR; INTRAVENOUS EVERY 2 HOUR PRN
Status: DISCONTINUED | OUTPATIENT
Start: 2019-01-01 | End: 2019-01-01

## 2019-01-01 RX ORDER — HYDRALAZINE HYDROCHLORIDE 25 MG/1
25 TABLET, FILM COATED ORAL EVERY 8 HOURS SCHEDULED
Status: DISCONTINUED | OUTPATIENT
Start: 2019-01-01 | End: 2019-01-01

## 2019-01-01 RX ORDER — HEPARIN SODIUM 5000 [USP'U]/ML
5000 INJECTION, SOLUTION INTRAVENOUS; SUBCUTANEOUS EVERY 12 HOURS SCHEDULED
Status: DISCONTINUED | OUTPATIENT
Start: 2019-01-01 | End: 2019-01-01

## 2019-01-01 RX ORDER — HYDROMORPHONE HYDROCHLORIDE 2 MG/1
2 TABLET ORAL EVERY 2 HOUR PRN
Status: DISCONTINUED | OUTPATIENT
Start: 2019-01-01 | End: 2019-01-01

## 2019-01-01 RX ORDER — LABETALOL HYDROCHLORIDE 5 MG/ML
10 INJECTION, SOLUTION INTRAVENOUS EVERY 10 MIN PRN
Status: COMPLETED | OUTPATIENT
Start: 2019-01-01 | End: 2019-01-01

## 2019-01-01 RX ORDER — OXYCODONE HYDROCHLORIDE 5 MG/1
2.5 TABLET ORAL EVERY 4 HOURS PRN
Status: DISCONTINUED | OUTPATIENT
Start: 2019-01-01 | End: 2019-01-01

## 2019-01-01 RX ORDER — METHYLPREDNISOLONE SODIUM SUCCINATE 125 MG/2ML
60 INJECTION, POWDER, LYOPHILIZED, FOR SOLUTION INTRAMUSCULAR; INTRAVENOUS EVERY 8 HOURS
Status: COMPLETED | OUTPATIENT
Start: 2019-01-01 | End: 2019-01-01

## 2019-01-01 RX ORDER — METOPROLOL TARTRATE 5 MG/5ML
5 INJECTION INTRAVENOUS ONCE
Status: DISCONTINUED | OUTPATIENT
Start: 2019-01-01 | End: 2019-01-01

## 2019-01-01 RX ORDER — IPRATROPIUM BROMIDE AND ALBUTEROL SULFATE 2.5; .5 MG/3ML; MG/3ML
3 SOLUTION RESPIRATORY (INHALATION) EVERY 4 HOURS PRN
Refills: 0 | Status: ON HOLD | COMMUNITY
Start: 2019-01-01 | End: 2019-01-01

## 2019-01-01 RX ORDER — SENNOSIDES 8.6 MG
8.6 TABLET ORAL 2 TIMES DAILY
Status: ON HOLD | COMMUNITY
End: 2019-01-01

## 2019-01-01 RX ORDER — BUMETANIDE 0.25 MG/ML
2 INJECTION, SOLUTION INTRAMUSCULAR; INTRAVENOUS ONCE
Status: COMPLETED | OUTPATIENT
Start: 2019-01-01 | End: 2019-01-01

## 2019-01-01 RX ORDER — ONDANSETRON 4 MG/1
4 TABLET, ORALLY DISINTEGRATING ORAL EVERY 6 HOURS PRN
Status: DISCONTINUED | OUTPATIENT
Start: 2019-01-01 | End: 2019-01-01

## 2019-01-01 RX ORDER — MELATONIN
325 2 TIMES DAILY WITH MEALS
Qty: 60 TABLET | Refills: 0 | Status: ON HOLD | OUTPATIENT
Start: 2019-01-01 | End: 2019-01-01

## 2019-01-01 RX ORDER — TORSEMIDE 20 MG/1
40 TABLET ORAL
Qty: 120 TABLET | Refills: 1 | Status: ON HOLD | OUTPATIENT
Start: 2019-01-01 | End: 2019-01-01

## 2019-01-01 RX ORDER — MAGNESIUM HYDROXIDE/ALUMINUM HYDROXICE/SIMETHICONE 120; 1200; 1200 MG/30ML; MG/30ML; MG/30ML
30 SUSPENSION ORAL 4 TIMES DAILY PRN
Status: ON HOLD | COMMUNITY
End: 2019-01-01

## 2019-01-01 RX ORDER — TORSEMIDE 20 MG/1
40 TABLET ORAL DAILY
Status: DISCONTINUED | OUTPATIENT
Start: 2019-01-01 | End: 2019-01-01

## 2019-01-01 RX ORDER — INSULIN ASPART 100 [IU]/ML
12 INJECTION, SOLUTION INTRAVENOUS; SUBCUTANEOUS ONCE
Status: COMPLETED | OUTPATIENT
Start: 2019-01-01 | End: 2019-01-01

## 2019-01-01 RX ORDER — HYDRALAZINE HYDROCHLORIDE 20 MG/ML
5 INJECTION INTRAMUSCULAR; INTRAVENOUS ONCE
Status: COMPLETED | OUTPATIENT
Start: 2019-01-01 | End: 2019-01-01

## 2019-01-01 RX ORDER — HYDRALAZINE HYDROCHLORIDE 50 MG/1
50 TABLET, FILM COATED ORAL EVERY 8 HOURS SCHEDULED
Qty: 90 TABLET | Refills: 11 | Status: ON HOLD | OUTPATIENT
Start: 2019-01-01 | End: 2019-01-01

## 2019-01-01 RX ORDER — IPRATROPIUM BROMIDE AND ALBUTEROL SULFATE 2.5; .5 MG/3ML; MG/3ML
3 SOLUTION RESPIRATORY (INHALATION) ONCE
Status: COMPLETED | OUTPATIENT
Start: 2019-01-01 | End: 2019-01-01

## 2019-01-01 RX ORDER — HYDRALAZINE HYDROCHLORIDE 20 MG/ML
10 INJECTION INTRAMUSCULAR; INTRAVENOUS ONCE
Status: COMPLETED | OUTPATIENT
Start: 2019-01-01 | End: 2019-01-01

## 2019-01-01 RX ORDER — GABAPENTIN 300 MG/1
300 CAPSULE ORAL 2 TIMES DAILY
Status: DISCONTINUED | OUTPATIENT
Start: 2019-01-01 | End: 2019-01-01

## 2019-01-01 RX ORDER — LORAZEPAM 2 MG/ML
2 INJECTION INTRAMUSCULAR EVERY 4 HOURS PRN
Status: DISCONTINUED | OUTPATIENT
Start: 2019-01-01 | End: 2019-01-01

## 2019-01-01 RX ORDER — ACETAMINOPHEN 325 MG/1
650 TABLET ORAL EVERY 8 HOURS
Status: DISCONTINUED | OUTPATIENT
Start: 2019-01-01 | End: 2019-01-01

## 2019-01-01 RX ORDER — FUROSEMIDE 40 MG/1
40 TABLET ORAL EVERY 8 HOURS PRN
Status: DISCONTINUED | OUTPATIENT
Start: 2019-01-01 | End: 2019-01-01

## 2019-01-01 RX ORDER — ATORVASTATIN CALCIUM 20 MG/1
20 TABLET, FILM COATED ORAL NIGHTLY
Refills: 0 | Status: ON HOLD | COMMUNITY
Start: 2019-01-01 | End: 2019-01-01

## 2019-01-01 RX ORDER — HYDROMORPHONE HYDROCHLORIDE 1 MG/ML
0.2 INJECTION, SOLUTION INTRAMUSCULAR; INTRAVENOUS; SUBCUTANEOUS ONCE
Status: DISCONTINUED | OUTPATIENT
Start: 2019-01-01 | End: 2019-01-01

## 2019-01-01 RX ORDER — METOPROLOL SUCCINATE 25 MG/1
25 TABLET, EXTENDED RELEASE ORAL
Qty: 90 TABLET | Refills: 3 | Status: ON HOLD | OUTPATIENT
Start: 2019-01-01 | End: 2019-01-01

## 2019-01-01 RX ORDER — METOPROLOL SUCCINATE 25 MG/1
25 TABLET, EXTENDED RELEASE ORAL
Status: DISCONTINUED | OUTPATIENT
Start: 2019-01-01 | End: 2019-01-01

## 2019-01-01 RX ORDER — SODIUM PHOSPHATE, DIBASIC AND SODIUM PHOSPHATE, MONOBASIC 7; 19 G/133ML; G/133ML
1 ENEMA RECTAL ONCE AS NEEDED
Status: COMPLETED | OUTPATIENT
Start: 2019-01-01 | End: 2019-01-01

## 2019-01-01 RX ORDER — LORAZEPAM 2 MG/ML
0.5 INJECTION INTRAMUSCULAR EVERY 4 HOURS PRN
Status: DISCONTINUED | OUTPATIENT
Start: 2019-01-01 | End: 2019-01-01

## 2019-01-01 RX ORDER — ISOSORBIDE MONONITRATE 30 MG/1
30 TABLET, EXTENDED RELEASE ORAL DAILY
Qty: 90 TABLET | Refills: 3 | Status: ON HOLD | OUTPATIENT
Start: 2019-01-01 | End: 2019-01-01

## 2019-01-01 RX ORDER — HYDROMORPHONE HYDROCHLORIDE 1 MG/ML
0.5 INJECTION, SOLUTION INTRAMUSCULAR; INTRAVENOUS; SUBCUTANEOUS
Status: DISCONTINUED | OUTPATIENT
Start: 2019-01-01 | End: 2019-01-01

## 2019-01-01 RX ORDER — IPRATROPIUM BROMIDE AND ALBUTEROL SULFATE 2.5; .5 MG/3ML; MG/3ML
SOLUTION RESPIRATORY (INHALATION)
Status: DISPENSED
Start: 2019-01-01 | End: 2019-01-01

## 2019-01-01 RX ORDER — CHLORAL HYDRATE 500 MG
1000 CAPSULE ORAL DAILY
Status: ON HOLD | COMMUNITY
End: 2019-01-01

## 2019-01-01 RX ORDER — METOPROLOL SUCCINATE 50 MG/1
50 TABLET, EXTENDED RELEASE ORAL
Qty: 90 TABLET | Refills: 3 | Status: ON HOLD | OUTPATIENT
Start: 2019-01-01 | End: 2019-01-01

## 2019-01-01 RX ORDER — ACETAMINOPHEN 160 MG/5ML
650 SOLUTION ORAL EVERY 6 HOURS SCHEDULED
Status: DISCONTINUED | OUTPATIENT
Start: 2019-01-01 | End: 2019-01-01

## 2019-08-01 NOTE — PROGRESS NOTES
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   His urine sed the past 2-3 years but these are infrequent. Has been started on allopurinol for his elevated uric acid. No history of acute renal failure, gross or microscopic hematuria, proteinuria, kidney stones or infections.   History of heart disease as noted above file    Tobacco Use      Smoking status: Former Smoker        Types: Cigarettes      Smokeless tobacco: Never Used       Family History:  No family history on file.      PHYSICAL EXAM:   /86   Wt 202 lb   BMI 30.71 kg/m²    Wt Readings from Last 3 Enc

## 2019-09-30 PROBLEM — S06.369A TRAUMATIC HEMORRHAGE OF CEREBRUM WITH LOSS OF CONSCIOUSNESS (HCC): Status: ACTIVE | Noted: 2019-01-01

## 2019-09-30 PROBLEM — S06.369A: Status: ACTIVE | Noted: 2019-01-01

## 2019-09-30 PROBLEM — I10 HYPERTENSION, UNSPECIFIED TYPE: Status: ACTIVE | Noted: 2019-01-01

## 2019-09-30 PROBLEM — R55 SYNCOPE, VASOVAGAL: Status: ACTIVE | Noted: 2019-01-01

## 2019-09-30 PROBLEM — R77.8 ELEVATED TROPONIN: Status: ACTIVE | Noted: 2019-01-01

## 2019-09-30 NOTE — PROGRESS NOTES
09/30/19 1300   Clinical Encounter Type   Visited With Patient   Patient's Supportive Strategies/Resources Spiritual care background:  [facility]    Referral To Nurse;Physician  (Patient signed POLST form/DNR/[facility].   Please page a c d/c note

## 2019-09-30 NOTE — PHYSICAL THERAPY NOTE
PHYSICAL THERAPY EVALUATION - INPATIENT     Room Number: 3335/7551-I  Evaluation Date: 9/30/2019  Type of Evaluation: Initial  Physician Order: PT Eval and Treat    Presenting Problem: L parietal and R parietal occipital lobe SAH  Reason for Therapy:  Mob History  Past Surgical History:   Procedure Laterality Date   • CABG     • CATARACT     • CATH PERCUTANEOUS  TRANSLUMINAL CORONARY ANGIOPLASTY  11/2018    w/stents x2   • COLONOSCOPY     • FRACTURE SURGERY Right     femur Fx w/nata   • TONSILLECTOMY superficial laceration to right cheek, below right eye    AM-PAC '6-Clicks' INPATIENT SHORT FORM - BASIC MOBILITY  How much difficulty does the patient currently have. ..  -   Turning over in bed (including adjusting bedclothes, sheets and blankets)?: A Lit session/findings; With [facility] staff; All patient questions and concerns addressed; Alarm set    ASSESSMENT   Patient is a 80year old male admitted on 9/29/2019 for traumatic hemorrhage of cerebrum with loss of conciousness.   Pertinent comorbidities and personal on 9/30/2019

## 2019-09-30 NOTE — CONSULTS
.1950 [facility] Patient Status:  Inpatient    1929 MRN [id number]   [facility] 6NE-A Attending Kami Merritt MD   Hosp Day # 0 PCP Michelle Loyola MD     Patient Identification  Yaw Saunders is a 80year old male. Circumstances (i.e., potential caregivers): assisted living/ group [address] / Accessibility: assisted living  Current Functional Status: Min-Max A      Past Medical History:  @Medical hx@  Past Medical History:   Diagnosis Date   • Back probl [facility]) injection 4 mg 4 mg Intravenous Q6H PRN   Or      Metoclopramide HCl (REGLAN) injection 5 mg 5 mg Intravenous Q8H PRN   LORazepam (ATIVAN) injection 1 mg 1 mg Intravenous Q15 Min PRN   hydrALAzine HCl (APRESOLINE) injection 10 mg 10 mg Intravenous 09/30/2019    INR 0.97 09/30/2019    PTP 13.3 09/30/2019    TROP 0.086 09/30/2019     09/30/2019    PGLU 265 09/30/2019       Review of Systems:  ROS as listed in HPI   Other 10 point review of systems within normal limits.     OBJECTIVE:    Blood pre bilaterally. Psychiatric: Awake, alert and oriented x 3. Able to register and recall 0/3                                       items.

## 2019-09-30 NOTE — CONSULTS
[facility]  Neurocritical Care       Name: Yemi Kaiser  MRN: [id number]  Admission Date/Time: 9/29/2019 11:27 PM  Primary Care Provider:  Iwona Soto MD        Reason for Consultation:   Traumatic SAH    HPI:   Patient is a pleasant 09/30/2019      Elevated troponin         Date Noted: 09/30/2019      Subarachnoid hemorrhage following injury with brief loss of consciousness but without open intracranial wound ([facility] [address]      Type 2 diabetes mellitus with stage 3 chronic kidney disease, wit History    Socioeconomic History      Marital status:        Spouse name: Not on file      Number of children: Not on file      Years of education: Not on file      Highest education level: Not on file    Tobacco Use      Smoking status: Former Smoke Or      Glucose-Vitamin C (DEX-4) chewable tab 4 tablet 4 tablet Oral Q15 Min PRN   Or      dextrose 50 % injection 50 mL 50 mL Intravenous Q15 Min PRN   Or      glucose (DEX4) oral liquid 30 g 30 g Oral Q15 Min PRN   Or      Glucose-Vitamin C (DEX-4) ch accomodation. # 3, 4, 6: Eyes move in all 6 cardinal directions normally and no Ptosis. # 5: Facial sensation to temp and light touch normal.   #7: No Facial asymmetry. # 8 Hearing normal.   # 9 & 10: Soft palate elevates normally.   # 11: Shoulder shr the deep white matter. Dictated by: Jillian Alex MD on 9/30/2019 at 6:29     Approved by: Jillian Alex MD            Ct Brain Or Head ([id number])    Result Date: 9/30/2019  PROCEDURE:  CT BRAIN OR HEAD (44385)  COMPARISON:  None.   INDICATIONS:  syncopal episode of the cervical spine is performed from the skull base through C7. Multiplanar reconstructions are generated. Dose reduction techniques were used.  Dose information is transmitted to the ACR ([employer]iology) Ezio Hunt [facility] Radiology Data Re Consider upright PA and lateral examination of the chest, when tolerated.     Dictated by: Eben Pruitt MD on 9/30/2019 at 8:24     Approved by: Eben Pruitt MD               Lab Review     Lab Results   Component Value Date    WBC 10.6 09/30/2019 Statin, goal Keep LDL < 70 and HDL > or around 40.  - Seizure prophylaxis if required. - Hold all antiplatelets and anticoagulants as of now. - CT Brain as above  - Repeat CT Brain in am stable SAH.  - Neurosurgery Consulted. - Daily CBC, BMP and Mg.  L

## 2019-09-30 NOTE — ED NOTES
approx 2.5 cm skin tear to RFA with eccyhmosis noted , abrasion to right forehead with bruising noted and superficial laceration to right upper cheek below right eye .  No active bleeding noted to any sites at this time

## 2019-09-30 NOTE — CM/SW NOTE
09/30/19 1000   CM/SW Referral Data   Referral Source Physician   Reason for Referral Discharge planning   Informant Patient   Patient Info   Patient's Mental Status Alert;Oriented   Patient's Home Environment Senior Independent Housing   Patient lives

## 2019-09-30 NOTE — SLP NOTE
ADULT SWALLOWING EVALUATION    ASSESSMENT    ASSESSMENT/OVERALL IMPRESSION:  Patient seen for swallowing evaluation per stroke protocol. Patient admitted after sustaining a fall and found to have small SAH L parietal and right parietal and occipital lobe. chronic kidney disease, without long-term current use of insulin (HCC)      Past Medical History  Past Medical History:   Diagnosis Date   • Back problem     \"lower back\"   • Cataract    • Congestive heart disease ([facility] [address]    • Coronary atherosclerosis    • Functional Limits  Tone: Reduced right facial  Range of Motion: Within Functional Limits  Rate of Motion: Within Functional Limits    Voice Quality: Clear  Respiratory Status: Unlabored  Consistencies Trialed: Thin liquids; Hard solid;Puree  Method of Prese

## 2019-09-30 NOTE — PROGRESS NOTES
[facility]  Neurocritical Care APRN Progress Note    NAME: Yaw Saunders - ROOM: [facility] - MRN: [id number] - Age: 80year old - :1929    History Of Present Illness:  Yaw Saunders is a 80year old male with PMHx significant for CA 62   Temp 98.3 °F (36.8 °C) (Temporal)   Resp 20   SpO2 93%               Physical Exam:    General Appearance: Alert, cooperative, no distress, appears stated age  Neck: Supple, symmetrical, trachea midline, no carotid bruits, adenopathy, or JVD  Lungs: C Assessment/Plan:  1. Traumatic ICH  - ICH order set  - Neuro checks Q1h  - Maintain SBP between 100-150 mmHg  - Cardene gtt PRN to maintain -150  - Labetalol 10mg IV PRN   - 0.9NS @ 75/hr  - Lipid panel.   Restart/Continue Statin, goal LDL<70 a

## 2019-09-30 NOTE — PLAN OF CARE
Assumed care of Humaira Arreola  @ approximately [phone]: awake, alert, oriented, pleasant, and cooperative with care, [facility] neuro exams- intact save slight right facial droop; on 2L NC; NSR on the monitor, hydralazine and labetalol x1 dose each, then nicardipine gtt init activity and pre-medicate as appropriate  Outcome: Progressing     Problem: CARDIOVASCULAR - ADULT  Goal: Maintains optimal cardiac output and hemodynamic stability  Description  INTERVENTIONS:  - Monitor vital signs, rhythm, and trends  - Monitor for blee range  Description  INTERVENTIONS:  - Monitor Blood Glucose as ordered  - Assess for signs and symptoms of hyperglycemia and hypoglycemia  - Administer ordered medications to maintain glucose within target range  - Assess barriers to adequate nutritional i development  - Assess and document skin integrity  - Assess and document dressing/incision, wound bed, drain sites and surrounding tissue  - Implement wound care per orders  - Initiate isolation precautions as appropriate  - Initiate Pressure Ulcer prevent assistive/communication devices  Outcome: Progressing

## 2019-09-30 NOTE — ED INITIAL ASSESSMENT (HPI)
Patient to ER via EMS From independent living facility s/p syncopal episode after using the bathroom. Per medics patient was on the ground for approximately 1 hour. Collared in field PTA. Denies LOC.

## 2019-09-30 NOTE — CONSULTS
[facility]  Neurosurgery Consult    Gina Payer Patient Status:  Inpatient    1929 MRN [id number]   [facility] 6NE-A Attending Abi Aparicio MD   Hosp Day # 0 PCP Lisa Mcclellan MD     REASON FOR CONSULTATION:  [facility] ([facility]    HIS He reports that he drank alcohol. He reports that he does not use drugs. ALLERGIES:  No Known Allergies    MEDICATIONS:    Medications Prior to Admission:  Metoprolol Succinate ER 50 MG Oral Tablet 24 Hr Take 50 mg by mouth daily.  Disp:  Rfl:  Taking infusion SOLN 100-200 mcg/min Intravenous Continuous PRN   ondansetron HCl (ZOFRAN) injection 4 mg 4 mg Intravenous Q6H PRN   Or      Metoclopramide HCl (REGLAN) injection 5 mg 5 mg Intravenous Q8H PRN   LORazepam (ATIVAN) injection 1 mg 1 mg Intravenous Q CA 8.2 09/30/2019    ALB 3.1 09/29/2019    ALKPHO 77 09/29/2019    BILT 0.3 09/29/2019    TP 6.7 09/29/2019    AST 12 09/29/2019    ALT 16 09/29/2019    PTT 34.8 09/30/2019    INR 0.97 09/30/2019    PTP 13.3 09/30/2019    TROP 0.164 09/30/2019    PGLU 152 =====  CONCLUSION:  1. No significant change when compared to most recent noncontrast CT of the brain performed 9/29/2019 as detailed above. 2. Findings most consistent with chronic small vessel ischemic change within the deep white matter.        ASS

## 2019-09-30 NOTE — ED PROVIDER NOTES
Patient Seen in: [facility] Emergency Department      History   Patient presents with:  Syncope (cardiovascular, neurologic)    Stated Complaint: syncopal episode at home     HPI    This 80year-old is in the emergency department tonight overnight w file    Drug use: Not on file             Review of Systems    Positive for stated complaint: syncopal episode at home   Other systems are as noted in HPI. Constitutional and vital signs reviewed.       All other systems reviewed and negative except as not speech    5 out of 5 strength all extremities equally.       ED Course     Labs Reviewed   COMP METABOLIC PANEL (14) - Abnormal; Notable for the following components:       Result Value    Glucose 211 (*)     BUN 43 (*)     Creatinine 2.41 (*)     Calculate individual orders. ABORH (BLOOD TYPE)   ANTIBODY SCREEN   RAINBOW DRAW BLUE   RAINBOW DRAW LAVENDER   RAINBOW DRAW LIGHT GREEN   RAINBOW DRAW GOLD   MRSA CULTURE ONLY     EKG    Rate, intervals and axes as noted on EKG Report.   Rate: 85  Rhythm: Sinus Rh 1:26 AM                Disposition and Plan     Clinical Impression:  Traumatic hemorrhage of cerebrum with loss of consciousness, unspecified laterality, initial encounter ([facility] [address]  (primary encounter diagnosis)  Hypertension, unspecified type  Syncope, vaso

## 2019-09-30 NOTE — OCCUPATIONAL THERAPY NOTE
OCCUPATIONAL THERAPY EVALUATION - INPATIENT     Room Number: 8920/6853-R  Evaluation Date: 9/30/2019  Type of Evaluation: Initial  Presenting Problem: Traumatic hemorrhage of c    Physician Order: IP Consult to Occupational Therapy  Reason for Therapy: ADL OCCUPATIONAL PROFILE    HOME SITUATION  Type of Home: Independent living facility  Home Layout: One level  Lives With: Alone    Toilet and Equipment: Comfort height toilet  Shower/Tub and Equipment: Walk-in shower  Other Equipment: Latesha Escudero regular lower body clothing?: A Lot  -   Bathing (including washing, rinsing, drying)?: A Lot  -   Toileting, which includes using toilet, bedpan or urinal? : A Little  -   Putting on and taking off regular upper body clothing?: A Little  -   Taking care o pacing also needed during room mobility. The patient is functioning below his previous functional level and would benefit from skilled inpatient OT to address the above deficits, maximizing patient’s ability to return safely to his prior level of function. sink x 4 minutes during OFH with 1 break SBA.

## 2019-09-30 NOTE — PROCEDURES
ELECTROENCEPHALOGRAM REPORT      Patient Name: Gina Holt  Chart ID: [id number]  Ordering Physician:   Date of Test: 9/30/2019    A routine EEG was obtained. No sedation was given. DX: SYNCOPE  HX: PT IS A 88 YO MALE WHO PRESENTS FOR A FALL.

## 2019-09-30 NOTE — PLAN OF CARE
Neuro intact denies pain. Son here to see. Updated care plan . EEG . Neuro surgery and Neurology here to see. No further need for CCU monitoring transfer when bed available updated daughter. Isolation to R/O C diff no loose stools. Up to chair for lunch .

## 2019-09-30 NOTE — PROGRESS NOTES
[facility] Pharmacy Note:  Renal Dose Adjustment for Metoclopramide (REGLAN)    Janeen Wright has been prescribed Metoclopramide (REGLAN) 10 mg every 8 hours as needed for nausea/vomiting.     Estimated Creatinine Clearance: 20.1 mL/min (A) (based on SCr of 2.41

## 2019-09-30 NOTE — H&P
1950 [facility] Patient Status:  Inpatient    1929 MRN [id number]   [facility] 6NE-A Attending Beverly Melton MD   Hosp Day # 0 PCP Kuldeep Cee MD     Flako Mendez is a 80year old male patient resident of [facility] temperature 99.2 °F (37.3 °C), temperature source Temporal, resp. rate (!) 27, weight 202 lb 13.2 oz (92 kg), SpO2 94 %.        Subjective:  Symptoms:  (80 year old  male with PMHx significant for CAD s/p CABG in 1990s and stent x 4 nine months ago [facility])  Active Problems:    Traumatic hemorrhage of cerebrum with loss of consciousness (HCC)    Hypertension, unspecified type    Syncope, vasovagal    Elevated troponin    Subarachnoid hemorrhage following injury with brief loss of consciousness but witho

## 2019-09-30 NOTE — CONSULTS
[address] [facility]  Report of Consultation    Kennedi Richey Patient Status:  Inpatient    1929 MRN [id number]   [facility] 6NE-A Attending Fortino Suazo MD   Hosp Day # 0 PCP Thomas White MD     Reason for Consultation: 50 mg Oral Daily Beta Blocker   • Insulin Aspart Pen  1-5 Units Subcutaneous TID CC and HS       Continuous Infusion:   • sodium chloride 75 mL/hr at 09/30/19 0336   • NiCARdipine 7.5 mg/hr (09/30/19 0810)   • phenylephrine     • NiCARdipine         PRN Me Readings from Last 3 Encounters:  09/30/19 : 202 lb 13.2 oz (92 kg)  08/01/19 : 202 lb (91.6 kg)  05/14/19 : 201 lb (91.2 kg)          General: Well developed, well nourished male. Pt is in no acute distress. HEENT:   Normocephalic. Atraumatic.   Eyes wi undetermined. Non-specific St and T abn.      Cath 10/18:  CORONARY ANGIOGRAPHY:  Dominance:Right  LM->95% Stenosis  Left Circ->100% Occlusion at origin  LAD->75% Proximal Stenosis  RCA->75% Proximal Stenosis; 99% Mid Stenosis  PDA->90% Stenosis  LIMA to L HTN   -On IV Cardene  5. HL  6.  DM  7. SAH   -Had been on DAPT prior to event  8. KUSH on CRI        Plan:  1. Serial CV isoenzymes  2. Echo  3. Tele monitor  4. Carotid US  5. Resume PO antihypertensives  6. BB  7. Check lipids  8.   Check ortho

## 2019-10-01 NOTE — PHYSICAL THERAPY NOTE
PHYSICAL THERAPY TREATMENT NOTE - INPATIENT    Room Number: 1790/8020-F     Session: 1   Number of Visits to Meet Established Goals: 5    Presenting Problem: L parietal and R parietal occipital lobe SAH    Problem List  Principal Problem:    Traumatic hem TOLERANCE  O2 Saturation: 96%  Liters of O2:  3.0  Shortness of breath  Heart Rate: at rest 66 bpm and with activity 93 bpm      AM-PAC '6-Clicks' INPATIENT SHORT FORM - BASIC MOBILITY  How much difficulty does the patient currently have. ..  -   Turning ov aware of session/findings; All patient questions and concerns addressed; Alarm set    ASSESSMENT   The pt exhibits improving strength, balance, and endurance as he is able to increase ambulation distance and requires decreased assistance for transfers and am

## 2019-10-01 NOTE — PROGRESS NOTES
[facility] Patient Status:  Inpatient    1929 MRN [id number]   [facility] 6NE-A Attending Isidoro He MD   Hosp Day # 1 PCP Chelle Ross MD     Anum Mayer is a 80year old male patient.     Patient Active source Temporal, resp. rate 20, weight 204 lb 9.4 oz (92.8 kg), SpO2 97 %. Subjective:  Symptoms:  (C/o weakness  Right side and pain on movement of shoulder, is also wheezing looks weaker. ). Objective:  General Appearance: In pain.     Taylor

## 2019-10-01 NOTE — PROGRESS NOTES
[facility] [facility]  Progress Note    Thania Zeng Patient Status:  Inpatient    1929 MRN [id number]   [facility] 7NE-A Attending Joy Michael MD   Hosp Day # 1 PCP Federico Sommers MD     Subjective:   No chest pain.   No acute distress. HEENT:  Normocephalic. Atraumatic. No icterus. Neck:  There is no jugular venous distention. Cardiovascular:  Cardiovascular examination demonstrates a regular rate and rhythm. There is normal S1, S2. There is no S3 or S4.   There ar Occlusion at origin  LAD->75% Proximal Stenosis  RCA->75% Proximal Stenosis; 99% Mid Stenosis  PDA->90% Stenosis  LIMA to LAD-> Widely patent  Sequential SVG to OM1 to OM2-> the SVG has severe atheroma/  atherosclerosis throughout with 70% narrowing in the -Had been on DAPT prior to event  8. KUSH on CRI    Plan:   1. Tele monitor  2. Maintain lytes WNL  3.  BB  4. Titrate antihypertensives  5. F/U orthostatics  6. Pending Echo may consider Lexiscan stress once stable clinically  7.   Resume antip

## 2019-10-01 NOTE — CONSULTS
[facility]  Report of Consultation    Kunal Cruz Patient Status:  Inpatient    1929 MRN [id number]   [facility] 7NE-A Attending Kirstie Horn MD   Hosp Day # 1 PCP Orlin Hathaway MD     Reason for Consultation:  Dyspnea hypo Disp:  Rfl:  Taking   omega-3 fatty acids 1000 MG Oral Cap Take 1,000 mg by mouth daily. Disp:  Rfl:  Taking   Clopidogrel Bisulfate 75 MG Oral Tab Take 75 mg by mouth daily.  Disp:  Rfl:  Taking   furosemide 40 MG Oral Tab Take 40 mg by mouth 2 (two) times Temporal 73 (!) 32 97 % —   09/30/19 2300 153/41 — — 58 18 95 % —   09/30/19 2200 133/30 — — 55 18 96 % —   09/30/19 2100 (!) 133/95 — — 64 23 96 % —   09/30/19 2000 159/44 98.2 °F (36.8 °C) Temporal 75 24 97 % —   09/30/19 1900 143/75 — — 75 (!) 31 — — 34.5 10/01/2019    INR 0.99 10/01/2019    PTP 13.5 10/01/2019    MG 2.5 10/01/2019    TROP 0.074 09/30/2019    CK 89 09/30/2019    PGLU 158 10/01/2019       Cultures:   Urine culture positive for enterococcus with mildly abnormal UA    Radiology:  Xr Chest Gilma Bauer  10/1/2019  11:47 AM

## 2019-10-01 NOTE — PLAN OF CARE
Assumed care of patient at [address]. Patient A/O X 4. Neuros Q4. Intact. Right side 4/5 strength r/t pain from fall. VSS, NSR on monitor. -150. PRN labetalol and hydralazine given w/ good result. Lungs clear diminished. Congested cough.  BL lower extremi activity and pre-medicate as appropriate  Outcome: Progressing     Problem: CARDIOVASCULAR - ADULT  Goal: Maintains optimal cardiac output and hemodynamic stability  Description  INTERVENTIONS:  - Monitor vital signs, rhythm, and trends  - Monitor for blee

## 2019-10-01 NOTE — PROGRESS NOTES
[facility] [facility] Progress Note    Ta Navarro Patient Status:  Inpatient    1929 MRN [id number]   [facility] [place] 7NE-A Attending Korina Munoz MD   Hosp Day # 1 PCP Murali Fernández MD         Subjective:  Ta Navarro is CREATSERUM 2.17 10/01/2019    BUN 44 10/01/2019     10/01/2019    K 4.4 10/01/2019     10/01/2019    CO2 31.0 10/01/2019     10/01/2019    CA 8.3 10/01/2019    PTT 34.5 10/01/2019    INR 0.99 10/01/2019    PTP 13.5 10/01/2019    MG 2.5 1 mild headache; no seizures and no episodes of speech arrest, confusion or auras of abnormal tastes,smells, or sounds;   patient denies any new focal numbness/tingling/weakness; denies n/v, chest pain / SOB    O:  Exam  /53 (BP Location: Left arm)   P Motor: limited due to pain in R UE; otherwise, 5/5 strength throughout, tone normal  Sensory: intact to light touch throughout   Coord: FNF intact with no tremor or dysmetria  Romberg: deferred  Gait: deferred    Imaging:   Ct Brain Or Head ([id number])    Re consolidation. Probable calcified pleural plaque formation. If there is persistent clinical concern then consider CT.     Dictated by: Itzel Fitzgerald MD on 10/01/2019 at 9:06     Approved by: Itzel Fitzgerald MD            Xr Chest Ap Portable  (cpt=71045)    Resul Date     (H) 09/30/2019     Lab Results   Component Value Date    AST 12 (L) 09/29/2019     Lab Results   Component Value Date    ALT 16 09/29/2019          Assessment/Plan:   Syncope with fall:   Acute left parietal , right parietal - occipital tra

## 2019-10-01 NOTE — OCCUPATIONAL THERAPY NOTE
OCCUPATIONAL THERAPY TREATMENT NOTE - INPATIENT     Room Number: 8311/4805-B  Session: 1   Number of Visits to Meet Established Goals: 4    Presenting Problem: Traumatic hemorrhage of c    History related to current admission: Pt was at his assisted living self-stated goal is to go home    OBJECTIVE  Precautions: (sbp 100-150)    WEIGHT BEARING RESTRICTION  Weight Bearing Restriction: None                PAIN ASSESSMENT  Ratin  Location: no pain reported        ACTIVITY TOLERANCE benefit from further testing on abstract cognition, executive function and attention. Patient End of Session: Up in chair;Needs met;Call light within reach;RN aware of session/findings; All patient questions and concerns addressed;SCDs in place    ASSESSM dressing:  with min assist - MET - Upgrade to Mod I  Patient will perform toileting: with min assist     Functional Transfer Goals  Patient will transfer from supine to sit:  with supervision - MET  Patient will transfer to toilet:  with supervision

## 2019-10-01 NOTE — PLAN OF CARE
Patient awake, alert and oriented x4  Telemetry, Sinus Rhythm, 1AVB; occasional PVCs  Right forehead, face, orbital and BUE bruising noted  Right arm lac with steri-strips   C/o right shoulder discomfort; at rest without movement pain 0/10; with even subtl management  - Manage/alleviate anxiety  - Utilize distraction and/or relaxation techniques  - Monitor for opioid side effects  - Notify MD/LIP if interventions unsuccessful or patient reports new pain  - Anticipate increased pain with activity and pre-medi Respiratory Therapy support as indicated  - Manage/alleviate anxiety  - Monitor for signs/symptoms of CO2 retention  Outcome: Progressing     Problem: METABOLIC/FLUID AND ELECTROLYTES - ADULT  Goal: Glucose maintained within prescribed range  Description skin care algorithm/standards of care as needed  Outcome: Progressing  Goal: Incision(s), wounds(s) or drain site(s) healing without S/S of infection  Description  INTERVENTIONS:  - Assess and document risk factors for pressure ulcer development  - Assess fatigue and changes in neurological status  - Encourage and assist patient to increase activity and self care with guidance from PT/OT  - Encourage visually impaired, hearing impaired and aphasic patients to use assistive/communication devices  Outcome: Pr

## 2019-10-02 NOTE — PLAN OF CARE
Patient a/OX4 pleasant and cooperative with care and staff  2L NC, no s/s of respiratory distress  Duonebs every 6 hours  C/O right shoulder pain, unable to fully complete assessment due to pain  Daughter at bedside  Call light within reach.  Needs voiced a non-pharmacological measures as appropriate and evaluate response  - Consider cultural and social influences on pain and pain management  - Manage/alleviate anxiety  - Utilize distraction and/or relaxation techniques  - Monitor for opioid side effects  - N Assess the need for suctioning and perform as needed  - Assess and instruct to report SOB or any respiratory difficulty  - Respiratory Therapy support as indicated  - Manage/alleviate anxiety  - Monitor for signs/symptoms of CO2 retention  Outcome: Josiah development  - Assess and document skin integrity  - Monitor for areas of redness and/or skin breakdown  - Initiate interventions, skin care algorithm/standards of care as needed  Outcome: Progressing  Goal: Incision(s), wounds(s) or drain site(s) healing Progressing  Goal: Achieves maximal functionality and self care  Description  INTERVENTIONS  - Monitor swallowing and airway patency with patient fatigue and changes in neurological status  - Encourage and assist patient to increase activity and self care

## 2019-10-02 NOTE — PLAN OF CARE
Assumed pt care @ 1930. No acute distress observed. Pt seen by Dr. Luana Parsons (Orthopedic). Pt c/o right knee pain when turning in bed for changing bed linen. Continued with 2L O2 per NC . Neb tx started. Echo, CXR to be done tomorrow.       Problem: Farzana Maintains optimal cardiac output and hemodynamic stability  Description  INTERVENTIONS:  - Monitor vital signs, rhythm, and trends  - Monitor for bleeding, hypotension and signs of decreased cardiac output  - Evaluate effectiveness of vasoactive medication hyperglycemia and hypoglycemia  - Administer ordered medications to maintain glucose within target range  - Assess barriers to adequate nutritional intake and initiate nutrition consult as needed  - Instruct patient on self management of diabetes  Outcome: sites and surrounding tissue  - Implement wound care per orders  - Initiate isolation precautions as appropriate  - Initiate Pressure Ulcer prevention bundle as indicated  Outcome: Progressing  Goal: Oral mucous membranes remain intact  Description  INTERV risk  Description  Interventions:  - Patient should be alert and upright for all feedings (90 degrees preferred)  - Offer food and liquids at a slow rate  - Encourage small bites of food and small sips of liquid  - Offer pills one at a time, crush or deliv

## 2019-10-02 NOTE — PROGRESS NOTES
10/02/19 0528 10/02/19 0531 10/02/19 0534   Vital Signs   Temp  --   --  98.6 °F (37 °C)   Temp src  --   --  Oral   Pulse 80 78 84   Heart Rate Source Monitor Monitor Monitor   Cardiac Rhythm NSR NSR NSR   Resp (!) 34 (!) 30 26   Respiratory Quality No

## 2019-10-02 NOTE — PROGRESS NOTES
1950 [facility] Patient Status:  Inpatient    1929 MRN [id number]   [facility] 7NE-A Attending Fortino Suazo MD   Hosp Day # 2 PCP MD Kennedi Rizzo is a 80year old male patient.     Patient Active source Oral, resp. rate 26, weight 204 lb 1.6 oz (92.6 kg), SpO2 94 %. Subjective:  Symptoms:  Improved. Objective:  Vital signs: (most recent): Blood pressure 133/76, pulse 63, temperature 98.6 °F (37 °C), temperature source Oral, resp.  rat

## 2019-10-02 NOTE — PROGRESS NOTES
[facility] Cardiology  Progress Note    Hattie Arango Patient Status:  Inpatient    1929 MRN [id number]   [facility] 7NE-A Attending Aldo Blanco MD   Hosp Day # 2 PCP Sindy Dorman MD     Subjective:   No chest pain.   Mil Normocephalic. Atraumatic. No icterus. Neck:  There is no jugular venous distention. Cardiovascular:  Cardiovascular examination demonstrates a regular rate and rhythm. There is normal S1, S2. There is no S3 or S4.   There are no murmurs, rubs, or ga Tele:  SR.       Diagnostics:  Cath 10/18:  CORONARY ANGIOGRAPHY:  Dominance:Right  LM->95% Stenosis  Left Circ->100% Occlusion at origin  LAD->75% Proximal Stenosis  RCA->75% Proximal Stenosis; 99% Mid Stenosis  PDA->90% Stenosis  LIMA to LAD-> Wid reotme MI               -S/P PCI / HIRAM 10/18  3. +Troponin               -Denies ACS Sx  4. HTN  5.  HL  6.  DM  7. SAH               -Had been on DAPT prior to event  8. KUSH on CRI  9. Mild DCHF    Plan:   1. Tele monitor  2. Maintain lytes WNL  3.

## 2019-10-02 NOTE — PROGRESS NOTES
[facility] Pulmonary/Critical Care Progress Note     SUBJECTIVE/Interval history: All events, procedures, notes reviewed. No acute events, he feels better today, denies dyspnea. Still with some cough with white phlegm. 2.17* 2.36*  2.36*   GFRAA 28* 30* 27*  27*   GFRNAA 24* 26* 24*  24*   CA 8.2* 8.3* 8.7  8.7    138 137  137   K 4.1 4.4 4.7  4.7    106 104  104   CO2 30.0 31.0 28.0  28.0     Recent Labs   Lab 09/30/19  0436 10/01/19  0412 10/02/19  0542   R Forrest Denver, MD on 10/01/2019 at 15:28     Approved by: Forrest Denver, MD            Ct Brain Or Head ([id number])    Result Date: 9/30/2019  CONCLUSION:  1.  No significant change when compared to most recent noncontrast CT of the brain performed 9/29 Increase bilateral basilar atelectasis. No large effusion but smaller effusion difficult to exclude. No pneumothorax. Mild vascular congestion without wilfrido CHF. Pleural calcifications are redemonstrated, suspect previous asbestos exposure.   Sternotomy **OR** dextrose **OR** glucose **OR** Glucose-Vitamin C, NiCARdipine, Labetalol HCl    Chest xray reviewed with pulm vascular congestion.  No new focal opacity or effusion    Assessment:  · Status post fall with subarachnoid hemorrhage remains stable with n

## 2019-10-02 NOTE — CONSULTS
ORTHOPAEDIC SURGERY CONSULT NOTE    Attending Physician: Ct Rodriguez  Consulting Physician: Kimberly Melo MD    Patient Name: Kunal Cruz  Age:  80year old  Sex: male  MRN: [id number]  : 1929  Date of Admission: 2019    REASON FOR CONSULTATION: humeral head from previous fracture    ASSESSMENT AND PLAN:  This is a 81 yo M with exacerbation of right shoulder cuff arthropathy    - At this time, the patient's shoulder pain is likely an acute on chronic pain from his arthropathy  - He could benefit f

## 2019-10-02 NOTE — PROGRESS NOTES
Orthostatic Hypotension          10/02/19 0528 10/02/19 0531 10/02/19 0534   Vital Signs   Pulse 80 78 84   Heart Rate Source Monitor Monitor Monitor   Cardiac Rhythm NSR NSR NSR   Resp (!) 34 (!) 30 26   Respiratory Quality Normal Normal Normal   BP (!) 1

## 2019-10-02 NOTE — PROCEDURES
Spirometry Findings:  Postbronchodilator FEV1 is 0.72 L, 29% predicted. Postbronchodilator FVC is 1.27L, 38% predicted. FEV1/ FVC ratio is 0.57. There is no significant bronchodilator response after administration of salbutamol.    The flow-volume loop d

## 2019-10-03 NOTE — PROGRESS NOTES
[facility] [address] [place] [address] Cardiology  Progress Note    Anum Mayer Patient Status:  Inpatient    1929 MRN [id number]   [facility] 7NE-A Attending Isidoro He MD   Hosp Day # 3 PCP Chelle Ross MD     Subjective:   No chest pain.   Fee kg)      Allergies:  No Known Allergies    Physical Exam:   General:  Well-developed / Well-nourished. No acute distress. HEENT:  Normocephalic. Atraumatic. No icterus. Neck:  There is no jugular venous distention.    Cardiovascular:  Cardiovascular ex 10.6 9.0 8.6   .0 240.0 253.0       Recent Labs   Lab 09/30/19  0436 10/01/19  0412 10/02/19  0542   PTP 13.3 13.5 13.7   INR 0.97 0.99 1.01       Recent Labs   Lab 09/30/19  0947 09/30/19  1201 09/30/19  1803   TROP 0.117* 0.086* 0.074*   CK 80 121 least 1 year. Assessment:    1. Suspected syncope               -Potential medication effect / hypotension               -Potential orthostasis               -?Arrythmia  2.   CAD               -S/P remote CABG               -S/P past reotme MI

## 2019-10-03 NOTE — SLP NOTE
SPEECH/LANGUAGE/COGNITIVE EVALUATION - INPATIENT    Admission Date: 9/29/2019  Evaluation Date: 10/03/19    Reason for Referral: Stroke protocol    ASSESSMENT & PLAN   ASSESSMENT & IMPRESSION  Patient seen for cognitive communication assessment per gianluca limitations in this regard. Increase bilateral basilar atelectasis. No large effusion   but smaller effusion difficult to exclude. No pneumothorax. Mild vascular congestion without wilfrido CHF.   Pleural calcifications are redemonstrated, suspect previous

## 2019-10-03 NOTE — PROGRESS NOTES
[facility]  Progress Note    Weston Ta Patient Status:  Inpatient    1929 MRN [id number]   [facility] 7NE-A Attending Loi Kramer MD   Hosp Day # 3 PCP Maggie Mortimer, MD     Subjective:  Weston Ta is a(n) 80year old m Lab 09/30/19  0436 10/01/19  0412 10/02/19  0542   PTP 13.3 13.5 13.7   INR 0.97 0.99 1.01   PTT 34.8 34.5 31.5       Cultures:   Urine with enterococcus    Radiology:      Chest x-rays reviewed yesterday's film with poor inspiratory effort infiltrates w

## 2019-10-03 NOTE — PLAN OF CARE
Pt AOx4. 2LNC.  . Nebs Q6. Pt no c/o pain. Ortho Daily. Plan to discharge to [facility] . Will continue to monitor.

## 2019-10-03 NOTE — PROGRESS NOTES
1950 [facility] Adriana Patient Status:  Inpatient    1929 MRN [id number]   [facility] 7NE-A Attending Aime Murphy MD   Hosp Day # 3 PCP MD Brooklyn Ortegaracquel Piña is a 80year old male patient.     Patient Active source Oral, resp. rate 22, weight 204 lb 1.6 oz (92.6 kg), SpO2 91 %.        Subjective:  Symptoms:  (C/O being short of breath and is a mouth breather and nasal canula for oxygen is not helping any, also c/o runny nose which seems to be doing better, want

## 2019-10-04 NOTE — PROGRESS NOTES
[facility] Pulmonary/Critical Care Progress Note     SUBJECTIVE/Interval history: All events, procedures, notes reviewed. No acute events, he feels \"fine\" today, denies dyspnea.  Cough improved, still some cough with w 2.36*  2.36* 2.10* 2.08*   GFRAA 27*  27* 31* 32*   GFRNAA 24*  24* 27* 27*   CA 8.7  8.7 8.6 8.6     137 139 138   K 4.7  4.7 4.5 4.9     104 105 104   CO2 28.0  28.0 29.0 27.0     Recent Labs   Lab 10/01/19  0412 10/02/19  0542 10/04/19  0543 Yessica Fenton MD on 10/01/2019 at 15:28     Approved by: Yessica Fenton MD             Carotid Doppler 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   On the right **OR** Metoclopramide HCl, LORazepam, ipratropium-albuterol, glucose **OR** Glucose-Vitamin C **OR** dextrose **OR** glucose **OR** Glucose-Vitamin C, Labetalol HCl    Chest xray reviewed with pulm vascular congestion.  No new focal opacity or effusion    A

## 2019-10-04 NOTE — PHYSICAL THERAPY NOTE
PHYSICAL THERAPY TREATMENT NOTE - INPATIENT    Room Number: 4727/2898-L     Session: 3  Number of Visits to Meet Established Goals: 5    Presenting Problem: L parietal and R parietal occipital lobe SAH    Problem List  Principal Problem:    Traumatic hemo Static Sitting: Fair  Dynamic Sitting: Fair -           Static Standing: Poor +  Dynamic Standing: Poor +    ACTIVITY TOLERANCE        AM-PAC '6-Clicks' INPATIENT SHORT FORM - BASIC MOBILITY  How much difficulty does the patient currently have. ..  -   T IR of R shoulder in limited range  Scapula retraction     Position Sitting     Repetitions   BLE x10-20   Sets   x1     Patient End of Session: Up in chair;Needs met;Call light within reach;RN aware of session/findings; All patient questions and concerns ad

## 2019-10-04 NOTE — PLAN OF CARE
Assumed care at 1930  A&O x 4. No new neuro changes overnight. Q4 neuros  Lungs diminished with expiratory wheezes. Congested nonproductive cough. Pt on 1-2 L O2 overnight.  Hanging around 88-89% on RA  HR NS to SB on tele  Orthostatic Bps negative  Meds gi

## 2019-10-04 NOTE — CONSULTS
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 Surgical History:   Procedure Laterality Date   • CABG     • CATARACT     • CATH PERCUTANEOUS  TRANSLUMINAL CORONARY ANGIOPLASTY  11/2018    w/stents x2   • COLONOSCOPY     • FRACTURE SURGERY Right     femur Fx w/nata   • TONSILLECTOMY       History reviewe (ATIVAN) injection 1 mg, 1 mg, Intravenous, Q15 Min PRN  •  ipratropium-albuterol (DUONEB) nebulizer solution 3 mL, 3 mL, Nebulization, Q4H PRN  •  glucose (DEX4) oral liquid 15 g, 15 g, Oral, Q15 Min PRN **OR** Glucose-Vitamin C (DEX-4) chewable tab 4 tab intact, moving all extremities  Skin: Warm and dry, no rashes      Laboratory Data:  Lab Results   Component Value Date    WBC 8.5 10/04/2019    HGB 9.5 10/04/2019    HCT 31.5 10/04/2019    .0 10/04/2019    CREATSERUM 2.08 10/04/2019    BUN 50 10/04

## 2019-10-04 NOTE — PROGRESS NOTES
1950 [facility] Adriana Patient Status:  Inpatient    1929 MRN [id number]   [facility] 7NE-A Attending Fortino Suazo MD   Hosp Day # 4 PCP MD Kennedi Rizzo is a 80year old male patient.     Patient Active source Oral, resp. rate 18, weight 202 lb 4.8 oz (91.8 kg), SpO2 94 %.        Subjective: 80year old with subarachnoid hge is doing better but still short of breath and up eating his breakfast.      Objective: /46 other vitals are stable, no neurolog

## 2019-10-04 NOTE — PROGRESS NOTES
[facility] Cardiology  Progress Note    Caroline Gutierrez Patient Status:  Inpatient    1929 MRN [id number]   [facility] 7NE-A Attending Stan Fenton MD   Hosp Day # 4 PCP Rafy Cai MD     Subjective:   No chest pain.   Rep 201 lb (91.2 kg)      Allergies:  No Known Allergies    Physical Exam:   General:  Well-developed / Well-nourished. No acute distress. HEENT:  Normocephalic. Atraumatic. No icterus. Neck:  There is no jugular venous distention.    Cardiovascular:  Card WBC 9.0 8.6 8.5   .0 253.0 229.0       Recent Labs   Lab 09/30/19  0436 10/01/19  0412 10/02/19  0542   PTP 13.3 13.5 13.7   INR 0.97 0.99 1.01       Recent Labs   Lab 09/30/19  0947 09/30/19  1201 09/30/19  1803   TROP 0.117* 0.086* 0.074*   CK 8 at least 1 year. Echo:  Conclusions:     1. Left ventricle: The cavity size was normal. Wall thickness was mildly     increased. Systolic function was normal. The estimated ejection fraction     was 55-60%.  There was no diagnostic evidence for regional

## 2019-10-04 NOTE — OCCUPATIONAL THERAPY NOTE
OCCUPATIONAL THERAPY TREATMENT NOTE - INPATIENT     Room Number: 1925/6972-U  Session: 2   Number of Visits to Meet Established Goals: 4    Presenting Problem: Traumatic hemorrhage of c    History related to current admission: Pt was at his assisted living TONSILLECTOMY         SUBJECTIVE  Pt stated, \"I can move so much more today\"    Patient self-stated goal is to go home    OBJECTIVE  Precautions: (sbp 100-150)    WEIGHT BEARING RESTRICTION  Weight Bearing Restriction: None                PAIN ASSESSMENT and use of adaptive techniques.  Pt is improved this session with following two step commands during mobility and multi-tasking by asking questions and looking for objects while walking, pt with improved ROM in right arm and shoulder, pt able to incorporate

## 2019-10-04 NOTE — PLAN OF CARE
Assumed care @0730  VSS, A&Ox4, neuro's q4h with R sided weakness and slight facial droop. Diminished/ clear lungs. 1L O2 saturations %  duonebs scheduled. NSR , Denies Pain, Up with 1 assist and walker. Tolerating carb controlled diet.   Intake an on type and severity of pain and evaluate response  - Implement non-pharmacological measures as appropriate and evaluate response  - Consider cultural and social influences on pain and pain management  - Manage/alleviate anxiety  - Utilize distraction and/ changes in respiratory status  - Assess for changes in mentation and behavior  - Position to facilitate oxygenation and minimize respiratory effort  - Oxygen supplementation based on oxygen saturation or ABGs  - Provide Smoking Cessation handout, if applic and assess for signs and symptoms of volume excess or deficit  - Monitor intake, output and patient weight  - Monitor urine specific gravity, serum osmolarity and serum sodium as indicated or ordered  - Monitor response to interventions for patient's volum MUSCULOSKELETAL - ADULT  Goal: Return mobility to safest level of function  Description  INTERVENTIONS:  - Assess patient stability and activity tolerance for standing, transferring and ambulating w/ or w/o assistive devices  - Assist with transfers and am all feedings (90 degrees preferred)  - Offer food and liquids at a slow rate  - Encourage small bites of food and small sips of liquid  - Offer pills one at a time, crush or deliver with applesauce as needed  - Discontinue feeding and notify MD (or speech eye glasses   10/4/2019 1616 by Maritza Jimenez RN  Outcome: Progressing  10/4/2019 1614 by Maritza Jimenez RN  Outcome: Progressing

## 2019-10-04 NOTE — PLAN OF CARE
Assumed care at 0700  AxOx4, 3L NC , SR on tele  Neuros q4, assessment as charted  Edema present  IV steriods and IV lasix ordered per pulm and cards  Wheezy, nebs given  Notified Dr. Judie Garcia of elevated BP .  Orders for PRN IV hydralazine  No c/o pain  Plan and/or hematoma  - Assess quality of pulses, skin color and temperature  - Assess for signs of decreased coronary artery perfusion - ex.  Angina  - Evaluate fluid balance, assess for edema, trend weights  Outcome: Progressing  Goal: Absence of cardiac arrhy skin integrity  - Assess and document dressing/incision, wound bed, drain sites and surrounding tissue  - Implement wound care per orders  - Initiate isolation precautions as appropriate  - Initiate Pressure Ulcer prevention bundle as indicated  Outcome: P and hot items on the patient's stronger side, the left side   Outcome: Progressing     Problem: Impaired Functional Mobility  Goal: Achieve highest/safest level of mobility/gait  Description  Interventions:  - Assess patient's functional ability and stabil

## 2019-10-05 NOTE — PROGRESS NOTES
[facility] Pulmonary/Critical Care Progress Note     SUBJECTIVE/Interval history: All events, procedures, notes reviewed. No acute events, he feels well today, denies dyspnea.  Cough improved, still some cough with white 127* 132* 227*   BUN 43*  43* 43* 50*   CREATSERUM 2.36*  2.36* 2.10* 2.08*   GFRAA 27*  27* 31* 32*   GFRNAA 24*  24* 27* 27*   CA 8.7  8.7 8.6 8.6     137 139 138   K 4.7  4.7 4.5 4.9     104 105 104   CO2 28.0  28.0 29.0 27.0     Recent Labs increasing interstitial opacities, correlate clinically with edema.     Dictated by: Genesis Hahn MD on 10/04/2019 at 7:53     Approved by: Genesis Hahn MD              • furosemide  40 mg Oral Daily   • predniSONE  40 mg Oral Daily with breakfast   • hydrALAzi

## 2019-10-05 NOTE — PLAN OF CARE
Assumed care @8197. Pt AOx4. VSS on 1L O2. Unable to wean off O2 without desating. Neuro assessments q4. See flowsheet. Seizure precautions in place. Plan to DC to [facility] tomorrow after 1 more CXR per their request.   Pt updated on POC.   Oriented to ro pre-medicate as appropriate  Outcome: Progressing     Problem: CARDIOVASCULAR - ADULT  Goal: Maintains optimal cardiac output and hemodynamic stability  Description  INTERVENTIONS:  - Monitor vital signs, rhythm, and trends  - Monitor for bleeding, hypoten range  Description  INTERVENTIONS:  - Monitor Blood Glucose as ordered  - Assess for signs and symptoms of hyperglycemia and hypoglycemia  - Administer ordered medications to maintain glucose within target range  - Assess barriers to adequate nutritional i development  - Assess and document skin integrity  - Assess and document dressing/incision, wound bed, drain sites and surrounding tissue  - Implement wound care per orders  - Initiate isolation precautions as appropriate  - Initiate Pressure Ulcer prevent assistive/communication devices  - keep bedside table and hot items on the patient's stronger side, the left side   Outcome: Progressing     Problem: Impaired Swallowing  Goal: Minimize aspiration risk  Description  Interventions:  - Patient should be aler Outcome: Progressing

## 2019-10-05 NOTE — PROGRESS NOTES
[facility]  Progress Note    Donato Orellana Patient Status:  Inpatient    1929 MRN [id number]   [facility] 7NE-A Attending Lanny Lundberg MD   Hosp Day # 5 PCP Kyle Pinto MD     No acute issues overnight      Current Facility-A 100 UNIT/ML flexpen 1-5 Units 1-5 Units Subcutaneous TID CC and HS   atorvastatin (LIPITOR) tab 20 mg 20 mg Oral Nightly   Labetalol HCl (TRANDATE) injection 10 mg 10 mg Intravenous Q4H PRN       Physical Exam:  /61 (BP Location: Right arm)   Pulse 7 regimen    2. h/o CHF due to valvular heart disease (MR / HR) + pul HTN / diastolic dysfunction with preserved EF  As above    3. s/p fall with SAH- neurologically stable; as per neuro / NSG    4. Syncope - per cardiology    5.   CAD s/p CABG 1990s + recent

## 2019-10-05 NOTE — RESPIRATORY THERAPY NOTE
Pt sat 88% upon arrival. PT sleeping Oxygen not in nose. Placed pt on 2L nasal canula. Pt wheezing. Neb given with improvement.  No SOB

## 2019-10-05 NOTE — PLAN OF CARE
Assumed care at 1930  A&O x 4. No new neuro changes overnight. Q4 neuros  Lungs diminished with expiratory wheezes. Congested nonproductive cough. Pt on 1-2 L O2 overnight.  Hanging around 88-89% on RA  HR NS to SB on tele  Meds given per orders  R arm very

## 2019-10-05 NOTE — CM/SW NOTE
Message left with donald martell  to check on bed availability; await call back    Received call back from juan ramon at [facility] jays[facility]--they requested a repeat chest x-ray tomorrow and if stable, will accept pt 10-6-19

## 2019-10-05 NOTE — PROGRESS NOTES
10/05/19 1809 10/05/19 1810 10/05/19 1813   Vital Signs   Pulse 92 90 84   Heart Rate Source Monitor Monitor Monitor   Resp 16 18 15   Respiratory Quality Normal Normal Normal   BP (!) 168/79 (!) 181/79 (!) 164/61   BP Location Right arm Right arm Right

## 2019-10-05 NOTE — PROGRESS NOTES
[facility]    Progress Note    Flako Mendez Patient Status:  Inpatient    1929 MRN [id number]   [facility] 7NE-A Attending Beverly Melton MD   [facility] Day # 5 PCP Kuldeep Cee MD         Subjective:   Flako Mendez is a(n) 80 yea ready for transfer to [facility] , taper of prednisone and close monitoring of glucose in [facility] ,            Results:     Lab Results   Component Value Date    WBC 8.5 10/04/2019    HGB 9.5 (L) 10/04/2019    HCT 31.5 (L) 10/04/2019    .0 10/04/

## 2019-10-05 NOTE — PROGRESS NOTES
[facility]    Cardiology Progress Note    Harvey Brown Patient Status:  Inpatient    1929 MRN [id number]   [facility] 7NE-A Attending Sylvester Kirkland MD   Hosp Day # 5 PCP Gayathri Burnette MD       Impression/Plan:  80year old male (92 kg)  09/30/19 0243 : 200 lb 13.4 oz (91.1 kg)  08/01/19 1228 : 202 lb (91.6 kg)  05/14/19 1427 : 201 lb (91.2 kg)      Tele: NSR    Physical Exam:    General: Alert and oriented x 3. No apparent distress. No respiratory or constitutional distress.   PING Oral Daily   predniSONE (DELTASONE) tab 40 mg 40 mg Oral Daily with breakfast   influenza vaccine (PF)(FLUZONE HD) high dose for 65 yrs & older inj 0.5ml 0.5 mL Intramuscular Prior to discharge   hydrALAzine HCl (APRESOLINE) tab 10 mg 10 mg Oral [facility]

## 2019-10-06 NOTE — PLAN OF CARE
Patient A/Ox4, anxious to go to  for rehab  Insulin adjusted per Dr. Stefani Lenz.   Verified with physician, 10 units with meals, no additional sliding scale  3L NC, dyspnea with exertion   Additional IV diuresis, adequate urine out    9 beats of VTACH at 2926 response  - Consider cultural and social influences on pain and pain management  - Manage/alleviate anxiety  - Utilize distraction and/or relaxation techniques  - Monitor for opioid side effects  - Notify MD/LIP if interventions unsuccessful or patient rep and instruct to report SOB or any respiratory difficulty  - Respiratory Therapy support as indicated  - Manage/alleviate anxiety  - Monitor for signs/symptoms of CO2 retention  Outcome: Progressing     Problem: METABOLIC/FLUID 6455 Edyta Lewis redness and/or skin breakdown  - Initiate interventions, skin care algorithm/standards of care as needed  Outcome: Progressing  Goal: Incision(s), wounds(s) or drain site(s) healing without S/S of infection  Description  INTERVENTIONS:  - Assess and docume care  Description  INTERVENTIONS  - Monitor swallowing and airway patency with patient fatigue and changes in neurological status  - Encourage and assist patient to increase activity and self care with guidance from PT/OT  - Encourage visually impaired, he distractions in the room when full attention is required  - Consider use of a daily journal to improve memory and reduce confusion related to daily events and orientation  - Allow additional time for processing after asking questions or providing instructi

## 2019-10-06 NOTE — PROGRESS NOTES
[facility] Pulmonary/Critical Care Progress Note     SUBJECTIVE/Interval history: All events, procedures, notes reviewed. No acute events overnight, RN notes expectorating phlegm but patient notes he feels well.  Thinks 10/02/19  0542 10/03/19  0557 10/04/19  0543 10/06/19  0627   *  127* 132* 227*  --    BUN 43*  43* 43* 50*  --    CREATSERUM 2.36*  2.36* 2.10* 2.08*  --    GFRAA 27*  27* 31* 32*  --    GFRNAA 24*  24* 27* 27*  --    CA 8.7  8.7 8.6 8.6  --    NA calcified pleural plaques bilaterally with pleural parenchymal scarring unchanged. Stable blunting of both costophrenic angles.     Dictated by: Don Judd MD on 10/06/2019 at 7:42     Approved by: Don Judd MD            Xr Chest Ap Portable  (cp smoking-possible bronchitis/evolving pneumonia aspiration   · History of coronary disease with stents 10/18-troponin leak improving-Plavix and aspirin-to be resumed per neurology and neurosurgery  · Chronic kidney disease-some improvement  · Subacute diarr

## 2019-10-06 NOTE — RESPIRATORY THERAPY NOTE
Pt on 3 L NC, 93%, BS bilateral exp wheeze. Pt tolerated txt well. Continue to monitor pt per protocol.

## 2019-10-06 NOTE — PROGRESS NOTES
[facility]    Progress Note    Hattie Arango Patient Status:  Inpatient    1929 MRN [id number]   [facility] 7NE-A Attending Aldo Blanco MD   [facility] Day # 6 PCP Sindy Dorman MD         Subjective:   Hattie Arango is a(n) 80 yea CKD (chronic kidney disease) stage 3, GFR 30-59 ml/min (HCC)    Congestive heart failure (HCC)    SAH (subarachnoid hemorrhage) (HCC)      Was not able to be discharged due to increased congestion and cough , will give additional dose of diuretic , monitor documentation in H+P. Based on patients current state of illness, I anticipate that, after discharge, patient will require TBD.       Cayetano Rankin MD  10/6/2019

## 2019-10-06 NOTE — PLAN OF CARE
Assumed care CA 8448  A&O x 4. No new neuro changes overnight. Q4 neuros  Lungs diminished with worsening expiratory wheezes. Congested productive cough worsening. MD paged. Robitussin ordered. Thick yellow secretions.  Pt had to get up to chair due to coug

## 2019-10-06 NOTE — PROGRESS NOTES
[facility]  Progress Note    Hollandalemaged Thomas Patient Status:  Inpatient    1929 MRN [id number]   [facility] 7NE-A Attending Rachid Horton MD   [facility] Day # 6 PCP Boogie Munoz MD     No acute issues overnight      Current Facility-A Oral Q15 Min PRN   Or      Glucose-Vitamin C (DEX-4) chewable tab 8 tablet 8 tablet Oral Q15 Min PRN   Insulin Aspart Pen (NOVOLOG) 100 UNIT/ML flexpen 1-5 Units 1-5 Units Subcutaneous TID CC and HS   atorvastatin (LIPITOR) tab 20 mg 20 mg Oral Nightly   L volume overloaded  - give additional IV diuretic today again on top on his usual oral regimen    2. h/o CHF due to valvular heart disease (MR / HR) + pul HTN / diastolic dysfunction with preserved EF  As above    3. s/p fall with [facility]- neurologically stable

## 2019-10-06 NOTE — PROGRESS NOTES
[facility]    Cardiology Progress Note    Pingree Carolus Patient Status:  Inpatient    1929 MRN [id number]   [facility] 7NE-A Attending Rachid Horton MD   Hosp Day # 6 PCP Boogie Munoz MD       Impression/Plan:  80year old male ml   Net -1465 ml       Last 3 Weights  10/06/19 0643 : 205 lb 4.8 oz (93.1 kg)  10/05/19 0534 : 202 lb 9.6 oz (91.9 kg)  10/04/19 0623 : 202 lb 4.8 oz (91.8 kg)  10/03/19 0500 : 217 lb 1.6 oz (98.5 kg)  10/02/19 0534 : 204 lb 1.6 oz (92.6 kg)  10/01/19 06 ALT 16 16 19   AST 12* 17 16   ALB 3.1* 2.8* 2.7*       Recent Labs   Lab 09/29/19  2337 09/30/19  0436 09/30/19  0947 09/30/19  1201 09/30/19  1803   TROP 0.048* 0.164* 0.117* 0.086* 0.074*       Allergies:   No Known Allergies    Medications:    Promise Haley Intravenous Q15 Min PRN   Or      glucose (DEX4) oral liquid 30 g 30 g Oral Q15 Min PRN   Or      Glucose-Vitamin C (DEX-4) chewable tab 8 tablet 8 tablet Oral Q15 Min PRN   Insulin Aspart Pen (NOVOLOG) 100 UNIT/ML flexpen 1-5 Units 1-5 Units Subcutaneous

## 2019-10-06 NOTE — PROGRESS NOTES
10/06/19 0554 10/06/19 0556 10/06/19 0559   Vital Signs   Pulse 95 87 82   /71 147/66 152/57   BP Location Right arm Right arm Right arm   Patient Position Lying Sitting Standing

## 2019-10-06 NOTE — PROGRESS NOTES
10/05/19 0530 10/05/19 0532 10/05/19 0534   Vital Signs   Pulse 63 65 68   Heart Rate Source Monitor Monitor Monitor   Resp 18 18 18   Respiratory Quality Normal Normal Normal   /51 152/43 (!) 164/48   BP Location Left arm Left arm Left arm   BP M

## 2019-10-07 NOTE — CM/SW NOTE
Pt is ready for d/c today. Nargis Borja from [facility] has insurance approval and a bed for pt today. Pt will go into Room 2276. RN to call report to [phone]. [facility] to  pt at 6:00pm.  PCS form completed and placed on chart.

## 2019-10-07 NOTE — PLAN OF CARE
NURSING DISCHARGE NOTE    Discharged Rehab facility via Ambulance. Accompanied by Support staff  Belongings Taken by patient/family.   PIV and tele dc'd  Medication changes, follow up appointments, and DC instructions reviewed  Questions and concerns a

## 2019-10-07 NOTE — PROGRESS NOTES
[facility] [address] [place] [address] Cardiology  Progress Note    Merry Gene Patient Status:  Inpatient    1929 MRN [id number]   [facility] 7NE-A Attending Osvaldo Wood MD   Hosp Day # 7 PCP Evon Stone MD     Subjective:   No chest pain.   Rep (91.6 kg)  05/14/19 : 201 lb (91.2 kg)      Allergies:  No Known Allergies    Physical Exam:   General:  Well-developed / Well-nourished. No acute distress. HEENT:  Normocephalic. Atraumatic. No icterus. Neck:  There is no jugular venous distention. 13.5 13.7   INR 0.99 1.01       Recent Labs   Lab 09/30/19  0947 09/30/19  1201 09/30/19  1803   TROP 0.117* 0.086* 0.074*   CK 80 121 89         Tele: SR.  No further NSVT noted      Diagnostics:  Cath 10/18:  CORONARY ANGIOGRAPHY:  Dominance:Right  LM->9 mildly     increased. Systolic function was normal. The estimated ejection fraction     was 55-60%. There was no diagnostic evidence for regional wall motion     abnormalities.  Features are consistent with a pseudonormal left     ventricular filling patter

## 2019-10-07 NOTE — PHYSICAL THERAPY NOTE
PHYSICAL THERAPY TREATMENT NOTE - INPATIENT    Room Number: 9649/3329-G     Session: 1   Number of Visits to Meet Established Goals: 5    Presenting Problem: L parietal and R parietal occipital lobe SAH  History related to current admission: Pt is 89 year Stroke [facility])     pt reports 20 yrs ago   • Visual impairment     glasses       Past Surgical History  Past Surgical History:   Procedure Laterality Date   • CABG     • CATARACT     • CATH PERCUTANEOUS  TRANSLUMINAL CORONARY ANGIOPLASTY  11/2018    w/stents (ft): 150  Assistive Device: Rolling walker  Pattern: Shuffle;R Flexed knee  Stoop/Curb Assistance: Not tested  Comment : Above grades based upon FIM definitions per department policy    Skilled Therapy Provided: pt recd sitting up in bedside chair, educat motivated to return to plof, and is able to actively participate with therapy. DISCHARGE RECOMMENDATIONS  PT Discharge Recommendations: Acute rehabilitation     PLAN  PT Treatment Plan: Bed mobility; Endurance; Patient education; Family education;Gai

## 2019-10-07 NOTE — PROGRESS NOTES
[facility]  Progress Note    Kunal Cruz Patient Status:  Inpatient    1929 MRN [id number]   [facility] 7NE-A Attending Kirstie Horn MD   [facility] Day # 7 PCP Orlin Hathaway MD     Subjective:  Kunal Cruz is a(n) 80year old m PTT 34.5 31.5       No results for input(s): ABGPHT, DYCNIB4M, KBJWD4H, ABGHCO3, ABGBE, TEMP, FABIO, SITE, DEV, THGB in the last 168 hours.     Invalid input(s): UZD35AMC, CHOB    Invalid input(s): CKTOTAL, TROPONINI, TROPONINT, CKMBINDEX    Cultures:  No liquid 15 g 15 g Oral Q15 Min PRN   Or      Glucose-Vitamin C (DEX-4) chewable tab 4 tablet 4 tablet Oral Q15 Min PRN   Or      dextrose 50 % injection 50 mL 50 mL Intravenous Q15 Min PRN   Or      glucose (DEX4) oral liquid 30 g 30 g Oral Q15 Min PRN   Or

## 2019-10-07 NOTE — PROGRESS NOTES
SUBJECTIVE: SOB on exertion. No seizures/fevers    CC: Impaired ADL and mobility dysfuction due to fall with SDH  Interval History:Remains on O2 via NC.  Improving chest congestion and cough  · History of coronary disease with stents 10/18-troponin leak imp 10/7/2019 1619  Last data filed at 10/7/2019 1232  Gross per 24 hour   Intake 840 ml   Output 1300 ml   Net -460 ml     LUNG: normal Breath Sounds, no wheezes, no rhonchi, no tachypnea.   CARDIOVASCULAR: Regular rate rhythm, Dorsalis pedis pulses normal kapil

## 2019-10-07 NOTE — PROGRESS NOTES
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     Meds:     Current Facili tablet Oral Q15 Min PRN   Or      dextrose 50 % injection 50 mL 50 mL Intravenous Q15 Min PRN   Or      glucose (DEX4) oral liquid 30 g 30 g Oral Q15 Min PRN   Or      Glucose-Vitamin C (DEX-4) chewable tab 8 tablet 8 tablet Oral Q15 Min PRN   atorvastatin

## 2019-10-07 NOTE — PROGRESS NOTES
[address] [facility] Adriana Patient Status:  Inpatient    1929 MRN [id number]   [facility] 7NE-A Attending Nahomy Phoenix MD   Hosp Day # 6 PCP You Diane MD     Emiliano Joseph is a 80year old male patient.     Patient Active hours as needed. Disp:  Rfl: 0   ipratropium-albuterol 0.5-2.5 (3) MG/3ML Inhalation Solution Take 3 mL by nebulization 4 (four) times daily.  Disp:  Rfl: 0   ipratropium-albuterol 0.5-2.5 (3) MG/3ML Inhalation Solution Take 3 mL by nebulization every 4 (fo sounds and rhonchi. Heart: Regular rhythm. S1 normal and S2 normal.    Abdomen: Abdomen is soft. Bowel sounds are normal.   There is no abdominal tenderness. Extremities: Decreased range of motion. There is dependent edema.     Neurological: Patie

## 2019-10-07 NOTE — PLAN OF CARE
Assumed care GQ 8262  A&O x 4. No new neuro changes overnight. Q4 neuros  Lungs diminished with expiratory wheezes. Congested productive cough improving. Thick yellow secretions. Pt 1-2L O2 overnight  HR NS to SB on tele.    Meds given per orders  R arm swe

## 2019-10-07 NOTE — PROGRESS NOTES
[address] [facility] Patient Status:  Inpatient    1929 MRN [id number]   [facility] 7NE-A Attending Ivana Paez MD   [facility] Day # 7 PCP Dom Mallory MD     Severiano Ching is a 80year old male patient.     Patient Active hours as needed. Disp:  Rfl: 0   ipratropium-albuterol 0.5-2.5 (3) MG/3ML Inhalation Solution Take 3 mL by nebulization 4 (four) times daily.  Disp:  Rfl: 0   ipratropium-albuterol 0.5-2.5 (3) MG/3ML Inhalation Solution Take 3 mL by nebulization every 4 (fo

## 2019-10-08 NOTE — CM/SW NOTE
10/08/19 0800   Discharge disposition   Expected discharge disposition Rehab [address]   Name of Facillity/Home Care/Hospice [facility]   Patient is Discharged to a [address] [place] [address] Yes   Discharge transportation [facility]

## 2019-10-10 NOTE — DISCHARGE SUMMARY
[facility]    Discharge Summary    Kevin Thomas Patient Status:  Inpatient    1929 MRN [id number]   [facility] 7NE-A Attending No att. providers found   [facility] Day # 7 PCP Boogie Munoz MD     Date of Admission: 2019 Dispos There is no abdominal tenderness. There is no mass. Extremities: Normal range of motion. (Trace edema)  Neurological: Patient is alert and oriented to person, place and time. Skin:  Warm.   (Forehead hematoma         Hospital Course: Evaluated and m

## 2019-10-14 NOTE — TELEPHONE ENCOUNTER
Patients Neurologist should be contacted regarding anti-platelet therapy. Called Dr. Katherine Ivan back to inform.

## 2019-10-14 NOTE — TELEPHONE ENCOUNTER
Spoke with caller and reviewed CT 9/30 which looks like traumatic SAH   Cleared to start Plavix needed more for his CAD  Dr Turner Amaro

## 2019-10-14 NOTE — TELEPHONE ENCOUNTER
S: Mariza Mercado from [place] wants to know if the pt can resume his Plavix. 75mg daily. B: Dr. Quinton Steiner saw him in the hospital during 9-29-19 thru 10-7-19    Dx. Traumatic hemorrhage of cerebrum with loss of consciousness unspecified Laterality

## 2019-10-22 PROBLEM — I50.9 ACUTE CONGESTIVE HEART FAILURE, UNSPECIFIED HEART FAILURE TYPE (HCC): Status: ACTIVE | Noted: 2019-01-01

## 2019-10-22 NOTE — CONSULTS
[facility] Cardiology  Report of Consultation    Merry Gene Patient Status:  Observation    1929 MRN [id number]   [facility] 8NE-A Attending Osvaldo Wood MD   Hosp Day # 0 PCP Evon Stone MD     Reason for [facility] atorvastatin  20 mg Oral Nightly   • cholecalciferol  1,000 Units Oral BID   • ferrous sulfate  325 mg Oral BID with meals   • furosemide  40 mg Oral BID (Diuretic)   • Metoprolol Succinate ER  50 mg Oral Daily Beta Blocker   • Potassium Chloride ER  20 mE ER 20 MEQ Oral Tab CR, Take 20 mEq by mouth 2 (two) times daily. , Disp: , Rfl:   triamcinolone acetonide 0.1 % External Ointment, Apply 0.1 % topically 2 (two) times daily. , Disp: , Rfl:   cholecalciferol 1000 units Oral Cap, Take 1,000 Units by mouth 2 (t TP 6.2*       Recent Labs   Lab 10/21/19  2155   RBC 3.27*   HGB 9.1*   HCT 29.5*   MCV 90.2   MCH 27.8   MCHC 30.8*   RDW 14.4   NEPRELIM 6.26   WBC 8.9   .0       No results for input(s): PTP, INR in the last 168 hours.     Recent Labs   Lab 10/2 COPD    Kelin Garcia MD  10/22/2019  5:06 PM

## 2019-10-22 NOTE — H&P
[address] [facility] Adriana Patient Status:  Observation    1929 MRN [id number]   [facility] 8NE-A Attending Rigoberto Barrera MD   Hosp Day # 0 PCP Augustine Carreon MD     Date:  10/22/2019  Date of Admission:  1 Oral Tab, Take 1 tablet (20 mg total) by mouth nightly., Disp: , Rfl: 0, 10/21/2019 at Unknown time  Metoprolol Succinate ER 50 MG Oral Tablet 24 Hr, Take 1 tablet (50 mg total) by mouth Daily Beta Blocker. , Disp: , Rfl: 0, 10/21/2019 at Unknown time  pred 10/21/2019    CREATSERUM 2.23 10/21/2019    BUN 57 10/21/2019     10/21/2019    K 4.2 10/21/2019     10/21/2019    CO2 24.0 10/21/2019     10/21/2019    CA 8.4 10/21/2019    ALB 3.0 10/21/2019    ALKPHO 70 10/21/2019    BILT 0.4 10/21/20

## 2019-10-22 NOTE — PLAN OF CARE
NURSING ADMISSION NOTE      Patient admitted via stretcher   Oriented to room. Safety precautions initiated. Bed in low position. Call light in reach. Pt admitted from ER for c/o sob and weakness.  Pt just at [facility] from 9/29-10/7 for [facility]  александр'lucio PINO

## 2019-10-22 NOTE — DIETARY NOTE
[facility]     Admitting diagnosis:  Acute congestive heart failure, unspecified heart failure type ([address] [I50.9]    Ht:  5'8\"  Wt: 93 kg (205 lb 0.4 oz). This is 133% of IBW  Body mass index is 31.17 kg/m².   IB

## 2019-10-22 NOTE — ED PROVIDER NOTES
Patient Seen in: [facility] Emergency Department      History   Patient presents with:  Dyspnea ESPERANZA SOB (respiratory)    Stated Complaint: esperanza    HPI    Patient is an 59-year-old male with a history of hypertension, coronary disease, CHF who present except as noted above.     Physical Exam     ED Triage Vitals [10/21/19 2147]   /89   Pulse 74   Resp (!) 32   Temp 97.9 °F (36.6 °C)   Temp src Temporal   SpO2 96 %   O2 Device Nasal cannula       Current:/63   Pulse 59   Temp 97.9 °F (36.6 °C) Pro-Beta Natriuretic Peptide 5,030 (*)     All other components within normal limits   CBC W/ DIFFERENTIAL - Abnormal; Notable for the following components:    RBC 3.27 (*)     HGB 9.1 (*)     HCT 29.5 (*)     MCHC 30.8 (*)     RDW-SD 47.0 (*)     All othe 9/30/2019 at 6:29     Approved by: Uzair Gaffney MD            Ct Brain Or Head ([id number])    Result Date: 9/30/2019  CONCLUSION:  1. Small amount of acute subarachnoid hemorrhage as detailed above.   2. Findings most consistent with chronic small vessel ischemic Fractured sternotomy wires are unchanged. There are calcified pleural plaques bilaterally with pleural parenchymal scarring unchanged. Stable blunting of both costophrenic angles.     Dictated by: Ashley Dixon MD on 10/06/2019 at 7:42     Approved by: ROSIE radiologist from [address] [place] [address] Radiology. Pleural calcification bilateral, possible previous asbestos exposure, advise correlation with occupational history. No definite acute lung consolidation. No sizeable effusion. Borderline heart size.   No sign of pneumo Date Reviewed: 8/1/2019          ICD-10-CM Noted POA    Congestive heart failure ([facility] [address] [facility] I50.9 Unknown Unknown

## 2019-10-22 NOTE — PROGRESS NOTES
@ 1610 did not cross over to Epic. Hypoglycemia protocol event notified to Dr. Leigh He. No further order at this time.

## 2019-10-22 NOTE — ED INITIAL ASSESSMENT (HPI)
Per EMS patient has been c/o sob for the past 2 hours. Labored breathing, currently getting an albuterol treatment via EMS. Denies cp.

## 2019-10-23 NOTE — PLAN OF CARE
Assumed care of pt at [address]. Pt alert and oriented x4, Saturating well on 2L O2, SR-SB on tele. No complaints of pain at this time. Pt with BLE swelling +2, sensation and pulses intact. No complaints of dizziness or lightheadedness. No chest pain.   Pt stil optimal cardiac output and hemodynamic stability  Description  INTERVENTIONS:  - Monitor vital signs, rhythm, and trends  - Monitor for bleeding, hypotension and signs of decreased cardiac output  - Evaluate effectiveness of vasoactive medications to optim

## 2019-10-23 NOTE — PLAN OF CARE
Pt A&Ox4, VSS, afebrile, sinus ed on tele. Pt denies pain, sitting up in chair. Lasix IV cont, 1500 ml fluid restriction. Pt updated on plan of care with understanding verbalized.

## 2019-10-23 NOTE — PROGRESS NOTES
[facility] [address] [place] [address] Cardiology  Progress Note    Yamileth Che Patient Status:  Observation    1929 MRN [id number]   [facility] 8NE-A Attending Ariella Dowell MD   Hosp Day # 0 PCP Basil Turk MD     Subjective:    No chest pain. Cardiovascular:  Cardiovascular examination demonstrates a regular rate and rhythm. There is normal S1, S2. There is no S3 or S4. There are no murmurs, rubs, or gallops. No click is appreciated. PMI is nondisplaced with a normal apical impulse.     P dysfunction. 2. Right ventricle: The cavity size was normal. Wall thickness was normal.     Systolic function was normal. Systolic pressure was mildly increased. 3. Left atrium: The left atrium was moderately to markedly dilated.   4. Right atrium: The at

## 2019-10-23 NOTE — PROGRESS NOTES
[address] [facility] Adriana Patient Status:  Observation    1929 MRN [id number]   [facility] 8NE-A Attending Aime Murphy MD   Hosp Day # 0 PCP Ninfa Lenz MD     Donovan Piña is a 80year old male patient.     Patient Activ resp. rate 20, weight 200 lb 6.4 oz (90.9 kg), SpO2 98 %. Subjective:  Symptoms:  (Feels much better as far as shortness of breath is concerned, no chest pain. ). Objective:  General Appearance:  Comfortable. Vital signs: (most recent):  B

## 2019-10-23 NOTE — PLAN OF CARE
Assumed care @ 0700. Pt a/o x4, VSS. Tele SB/SR. HR 60-70's. 2LNC O2 sat 92-96%. Lung sound - exp wheezes, more on the left. Denies any pain.   Pt ambulated with 1 person and walker.    (+) BM, voided per urinal.  IV Lasix given per Dr. Sharron Curtis

## 2019-10-24 NOTE — PLAN OF CARE
Patient received at approx 0730 this AM. Patient alert and oriented; no family at bedside. Patient reports his breathing is either; he denies shortness of breath.  Patient up to bathroom with standby assist and walker; had large BM this AM. Patient tolerati

## 2019-10-24 NOTE — PLAN OF CARE
Assumed care for pt at 19:30 on 10/23    A&Ox4  Denies pain or NIRALI  Elevated BP, 167/65. Other VSS on 2L, spO2 95%  SR-SB on tele  Lungs diminished.   IV lasix BID, BLE +1 edema  Voiding in urinal  Up with walker and standby.  Bed alarm on   HF protocol,

## 2019-10-24 NOTE — PROGRESS NOTES
10/23/19 2038 10/23/19 2041 10/23/19 2044   Vital Signs   Temp 98 °F (36.7 °C)  --   --    Temp src Oral  --   --    Pulse 61 66 58   Heart Rate Source Monitor  --   --    Resp 18  --   --    BP (!) 178/48 (!) 162/54 (!) 169/44   BP Location Left arm Le

## 2019-10-24 NOTE — PROGRESS NOTES
[facility]  Progress Note    Francie Valentino Patient Status:  Observation    1929 MRN [id number]   [facility] 8NE-A Attending Beau Collier MD   Hosp Day # 0 PCP Aura Ortiz MD     Subjective:    No chest pain. No acute distress. HEENT:  Normocephalic. Atraumatic. No icterus. Neck:  There is no jugular venous distention. Cardiovascular:  Cardiovascular examination demonstrates a regular rate and rhythm. There is normal S1, S2. There is no S3 or S4.   Ther wall motion     abnormalities. Features are consistent with a pseudonormal left     ventricular filling pattern, with concomitant abnormal relaxation and     increased filling pressure - grade 2 diastolic dysfunction. 2. Right ventricle:  The cavity size w

## 2019-10-24 NOTE — PROGRESS NOTES
1950 [facility] Adriana Patient Status:  Observation    1929 MRN [id number]   [facility] 8NE-A Attending Corinne Story MD   Hosp Day # 0 PCP MD Juan J Dunham is a 80year old male patient.     Patient Activ resp. rate 18, weight 200 lb 6.4 oz (90.9 kg), SpO2 95 %. Subjective:  Symptoms:  (Feels much better. No chest pain edema is decreased. ). Objective:  General Appearance:  Comfortable.     Vital signs: (most recent): Blood pressure (!) 162/56,

## 2019-10-25 NOTE — CM/SW NOTE
MSW spoke with the patient who [facility] He has a 3 midnight hospitalization within the last 30 days and was at [facility] until 10/7. MSW sent a referral [facility]. DON screen called. Awaiting discharge disposition.     Venu

## 2019-10-25 NOTE — HOME CARE LIAISON
Met with patient at the bedside to offer home health choice. Patient declined [facility] and wants to use [facility] health services. Residential brochure provided with contact information. All questions addressed and answered.

## 2019-10-25 NOTE — PROGRESS NOTES
10/25/19 0518 10/25/19 0520 10/25/19 0522   Vital Signs   BP (!) 167/51 147/53 149/59   BP Location Left arm Left arm Left arm   BP Method Automatic Automatic Automatic   Patient Position Lying Standing Standing     Asymptomatic

## 2019-10-25 NOTE — PLAN OF CARE
Assumed care of pt at 2330 asleep and easily arousable. Monitor shows sinus rhythm - orthos in am.  He denied pain. He was received on RA with sats dipping to 87% and 2L was applied with sats up to mid 90's.   Lasix IV BID, physical therapy, daily wt, lab saturation or ABGs  - Provide Smoking Cessation handout, if applicable  - Encourage broncho-pulmonary hygiene including cough, deep breathe, Incentive Spirometry  - Assess the need for suctioning and perform as needed  - Assess and instruct to report SOB o

## 2019-10-25 NOTE — PHYSICAL THERAPY NOTE
PHYSICAL THERAPY QUICK EVALUATION - INPATIENT    Room Number: 1550/3968-F  Evaluation Date: 10/25/2019  Presenting Problem: CHF  Physician Order: PT Eval and Treat   Pt admitted from [facility]  He is s/p fall and SDH 1 month ago, DC to Air: 93            AM-PAC '6-Clicks' [address]  How much difficulty does the patient currently have. ..  -   Turning over in bed (including adjusting bedclothes, sheets and blankets)?: None   -   Sitting down on and standing up f from increased assistance/supervision at home. PT Discharge Recommendations: Home with home health PT    PLAN  Patient has been evaluated and presents with no skilled Physical Therapy needs at this time. Patient discharged from Physical Therapy services.

## 2019-10-25 NOTE — OCCUPATIONAL THERAPY NOTE
OCCUPATIONAL THERAPY EVALUATION - INPATIENT     Room Number: 1085/3671-T  Evaluation Date: 10/25/2019  Type of Evaluation: Initial  Presenting Problem: CHF    Physician Order: IP Consult to Occupational Therapy  Reason for Therapy: ADL/IADL Dysfunction and pulling call line string, but was unclear about ADL assistance availability. SUBJECTIVE   \"I was not ready to go back there. \" When referring to d/c from [facility] to [facility] ([facility]).        OBJECTIVE  Precautions: Cardiac;Bed/chair alarm  Fall Risk: High fall risk of dizziness. Educated the pt about possibly asking for ADL assistance at independent living facility.   Pt said \"yes\" when OT asked him if he can ask for assistance if he needed ADL assistance, but chart is indicating that pt lives in independent apt por Treatment Plan: Balance activities; Energy conservation/work simplification techniques;ADL training;Functional transfer training;UE strengthening/ROM; Patient/Family education;Patient/Family training;Equipment eval/education; Compensatory technique education

## 2019-10-25 NOTE — PROGRESS NOTES
Lexiscan stress test  Lexiscan injection given  Pt denied cardiac symptoms  Rare isolated PAC and occasional isolated PVCs seen  SPO2 92-95% on room air  Pt tolerated well  Nuclear images pending  Karin Bell, 10/25/19, 8:56 AM

## 2019-10-25 NOTE — CM/SW NOTE
MS[facility]. MSW paged [facility] who will meet with the patient at bedside to provide choice, explanation of services, and financial disclosure.     Rosi Phillips LCSW

## 2019-10-25 NOTE — PLAN OF CARE
Assumed care of pt around 1930. Pt Aox4. RA. Denies pain. NSR on tele. Family at bedside, patient and family updated on plan for lexiscan in am. Will coninue to monitor.          Problem: Patient/Family Goals  Goal: Patient/Family Long Term Goal  Descriptio partner  - Complete POLST form as appropriate  - Assess patient's ability to be responsible for managing their own health  - Refer to Case Management Department for coordinating discharge planning if the patient needs post-hospital services based on physic

## 2019-10-25 NOTE — PROGRESS NOTES
Received this am alert and oriented  No complaints of pain  NSR on monitor  Lungs diminished bilaterally  Up with 1 assist and walker  Will monitor.

## 2019-10-25 NOTE — PROGRESS NOTES
1950 [facility] Adriana Patient Status:  Observation    1929 MRN [id number]   [facility] 8NE-A Attending Abi Aparicio MD   Hosp Day # 0 PCP Lisa Mcclellan MD     Gina Holt is a 80year old male patient.     Patient Activ rate 18, weight 196 lb 13.9 oz (89.3 kg), SpO2 93 %. Subjective:  Symptoms:  (Feels fine, had his Lexiscan and was told it was \"good\". ). Objective:  General Appearance:  Comfortable and in no acute distress.     Vital signs: (most recent):

## 2019-10-25 NOTE — PROGRESS NOTES
[facility]  Progress Note    Murtaza Quiroga Patient Status:  Observation    1929 MRN [id number]   [facility] 8NE-A Attending Adam Barraza MD   Hosp Day # 0 PCP Madeleine Zhu MD     Subjective:    No chest pain. Atraumatic. No icterus. Neck:  There is no jugular venous distention. Cardiovascular:  Cardiovascular examination demonstrates a regular rate and rhythm. There is normal S1, S2. There is no S3 or S4. There are no murmurs, rubs, or gallops.   No click was no diagnostic evidence for regional wall motion     abnormalities.  Features are consistent with a pseudonormal left     ventricular filling pattern, with concomitant abnormal relaxation and     increased filling pressure - grade 2 diastolic dysfunction

## 2019-10-26 NOTE — PLAN OF CARE
Assumed care of pt at [address] a/o x3 in bed having just transerred from the chair with assistance and is now resting comfortably. Monitor shows sinus rhythm with still elevated BP. PO hydralazine was given and IVP hydralazine had to be given later on.   Will services based on physician/LIP order or complex needs related to functional status, cognitive ability or social support system  Outcome: Progressing     Problem: CARDIOVASCULAR - ADULT  Goal: Maintains optimal cardiac output and hemodynamic stability  Kenneth METABOLIC/FLUID AND ELECTROLYTES - ADULT  Goal: Electrolytes maintained within normal limits  Description  INTERVENTIONS:  - Monitor labs and rhythm and assess patient for signs and symptoms of electrolyte imbalances  - Administer electrolyte replacement a

## 2019-10-26 NOTE — PROGRESS NOTES
[facility] Cardiology  Progress Note    Caroline Gutierrez Patient Status:  Observation    1929 MRN [id number]   [facility] 8NE-A Attending Stan Fenton MD   Hosp Day # 0 PCP Rafy Cai MD     Subjective:    No chest pain or and rhythm. There is normal S1, S2. There is no S3 or S4. There are no murmurs, rubs, or gallops. No click is appreciated. PMI is nondisplaced with a normal apical impulse. Pulmonary:  Lungs are coarse on auscultation bilaterally.   There are no foc - grade 2 diastolic dysfunction. 2. Right ventricle: The cavity size was normal. Wall thickness was normal.     Systolic function was normal. Systolic pressure was mildly increased. 3. Left atrium: The left atrium was moderately to markedly dilated.   4.

## 2019-10-26 NOTE — PLAN OF CARE
Assumed pt care at 0730. RA, denies pain/SOB,lung sounds clear/diminished  Normal Sinus Rhythm on tele. Up with standby assist and walker.  POC is to possibly d/c today to [employer] approval , POC updated with pt and family, questions care, etc)  - Arrange for interpreters to assist at discharge as needed  - Consider post-discharge preferences of patient/family/discharge partner  - Complete POLST form as appropriate  - Assess patient's ability to be responsible for managing their own he Encourage broncho-pulmonary hygiene including cough, deep breathe, Incentive Spirometry  - Assess the need for suctioning and perform as needed  - Assess and instruct to report SOB or any respiratory difficulty  - Respiratory Therapy support as indicated mobility/gait  Description  Interventions:  - Assess patient's functional ability and stability  - Promote increasing activity/tolerance for mobility and gait  - Educate and engage patient/family in tolerated activity level and precautions  - Recommend use

## 2019-10-26 NOTE — PROGRESS NOTES
[facility] Patient Status:  Observation    1929 MRN [id number]   [facility] 8NE-A Attending Evelyn Raines MD   Hosp Day # 0 PCP Sofia Dupree MD     Ricardo Esquivel is a 80year old male patient.     Patient Activ mouth BID (Diuretic). , Disp: 120 tablet, Rfl: 1        No Known Allergies    Principal Problem:    Acute congestive heart failure, unspecified heart failure type (HCC)  Active Problems:    Congestive heart failure (HCC)      Blood pressure (!) 164/62, puls

## 2019-10-26 NOTE — PLAN OF CARE
Explained discharge instructions including medications and follow-ups to the patient and family, verbalized understanding, IV removed, tele monitor discontinued, will be transported via wheelchair.  Patient's daughter will be transporting him to [facility] resources  Description  INTERVENTIONS:  - Identify barriers to discharge w/pt and caregiver  - Include patient/family/discharge partner in discharge planning  - Arrange for needed discharge resources and transportation as appropriate  - Identify discharge threatening arrhythmias  - Monitor electrolytes and administer replacement therapy as ordered  10/26/2019 1506 by Odalys Avila RN  Outcome: Adequate for Discharge  10/26/2019 0940 by Odalys Avila RN  Outcome: Progressing     Problem: RESPIRATORY - AD including rhythm and repeat lab results as appropriate  - Fluid restriction as ordered  - Instruct patient on fluid and nutrition restrictions as appropriate  10/26/2019 1506 by Stevie Porter RN  Outcome: Adequate for Discharge  10/26/2019 0940 by Kirstie Althea Castro RN  Outcome: Adequate for Discharge  10/26/2019 0940 by Althea Castro RN  Outcome: Progressing

## 2019-10-26 NOTE — PROGRESS NOTES
10/26/19 0407 10/26/19 0409 10/26/19 0411   Vital Signs   BP (!) 188/65 (!) 179/65 (!) 173/53   BP Location Left arm Left arm Left arm   BP Method Automatic Automatic Automatic   Patient Position Lying Sitting Standing

## 2019-10-26 NOTE — CM/SW NOTE
YESSICA called [facility] to ask if bed is available, message left for CJ, await call back. Alexander John called back to advise that they can accept patient today. Pt will go to room 204 A. Unit RN to call [phone] with nurse report.        Sarah Galarza, BRENDENW

## 2019-10-29 PROBLEM — I25.10 CORONARY ARTERY DISEASE INVOLVING NATIVE CORONARY ARTERY OF NATIVE HEART WITHOUT ANGINA PECTORIS: Status: ACTIVE | Noted: 2019-01-01

## 2019-10-29 PROBLEM — I50.32 CHRONIC DIASTOLIC HEART FAILURE (HCC): Status: ACTIVE | Noted: 2019-01-01

## 2019-10-30 NOTE — DISCHARGE SUMMARY
[facility]    Discharge Summary    Yemi Kaiser Patient Status:  Observation    1929 MRN [id number]   [facility] 8NE-A Attending No att. providers found   Hosp Day # 0 PCP Iwona Soto MD     Date of Admission: 10/21/2019 [age] year old male who was admitted for a fall 3 weeks ago and did not have any functional loss and was sent for Rehab to [facility] and was discharged to [facility] ([facility]) yesterday and had shortness of breath and was sent to ED and found to be in CHF with BNP 5000 and he wa (two) times daily. Refills:  0     Clopidogrel Bisulfate 75 MG Tabs  Commonly known as:  PLAVIX      Take 75 mg by mouth. Refills:  0     ferrous sulfate 325 (65 FE) MG Tbec      Take 1 tablet (325 mg total) by mouth 2 (two) times daily with meals.    Q

## 2019-11-14 PROBLEM — I50.9 ACUTE ON CHRONIC CONGESTIVE HEART FAILURE, UNSPECIFIED HEART FAILURE TYPE (HCC): Status: ACTIVE | Noted: 2019-01-01

## 2019-11-14 NOTE — ED INITIAL ASSESSMENT (HPI)
Onset last night with upper abdominal/lower chest pain, generalized weakness, and shortness of breath on exertion. Per medics, patient was 86% on room air this afternoon. He normally wears oxygen at night time only.

## 2019-11-14 NOTE — ED PROVIDER NOTES
Patient Seen in: [facility] Emergency Department      History   Patient presents with:  Dyspnea NIRALI SOB (respiratory)    Stated Complaint: sob    HPI    80-year-old male presents for evaluation of abdominal pain and shortness of breath.   Patient sta Review of Systems    Positive for stated complaint: sob  Other systems are as noted in HPI. Constitutional and vital signs reviewed. All other systems reviewed and negative except as noted above.     Physical Exam     ED Triage Vitals [11/14/19 RBC 3.38 (*)     HGB 9.1 (*)     HCT 30.0 (*)     MCHC 30.3 (*)     Monocyte Absolute 1.42 (*)     All other components within normal limits   TROPONIN I - Normal   LACTIC ACID, PLASMA - Normal   CBC WITH DIFFERENTIAL WITH PLATELET    Narrative:      The fo fluid collection, ductal dilatation, or atrophy. SPLEEN:  No enlargement or focal lesion. ADRENALS:  No mass or enlargement. KIDNEYS:  Extensive vascular calcification within the renal jesus bilaterally. No hydronephrosis. Mild perinephric stranding. obtained. COMPARISON:  [facility]WARD , XR, XR CHEST AP PORTABLE  (CPT=71045), 10/21/2019, 22:07. [facility]WARD , XR, XR CHEST AP PORTABLE  (CPT=71045), 10/06/2019, 5:15.   INDICATIONS:  sob  PATIENT STATED HISTORY: (As transcribed by Technologist)  Patient is having di

## 2019-11-15 NOTE — DIETARY NOTE
[facility]     Admitting diagnosis:  Acute on chronic congestive heart failure, unspecified heart failure type ([facility] [I50.9]    Ht: 172.7 cm (5' 8\")  Wt: 85.5 kg (188 lb 7.9 oz).  This is 122 % of IBW  Body mass

## 2019-11-15 NOTE — CONSULTS
[facility]                       Gastroenterology Consultation-[facility]    Anum Mayer Patient Status:  Inpatient    1929 MRN [id number]   [facility] 2NE-A Attending Isidoro He MD   [facility] Day # 1 PCP Wai CABG     • CATARACT     • CATH PERCUTANEOUS  TRANSLUMINAL CORONARY ANGIOPLASTY  11/2018    w/stents x2   • COLONOSCOPY     • FRACTURE SURGERY Right     femur Fx w/nata   • TONSILLECTOMY       Medications: acetaminophen (TYLENOL) tab 650 mg, 650 mg, Oral, Q4 Disease:  No chronic infections or recent fevers prior to the acute illness            Cardiovascular: + CAD s/p CABG and PCI (10/18), HTN, CHF, MI, dyslipidemia             Respiratory: + COPD            Hematologic: The patient reports no easy bruising, CO2 32.0 11/15/2019     11/15/2019    CA 8.6 11/15/2019    ALB 2.8 11/15/2019    ALKPHO 58 11/15/2019    BILT 0.4 11/15/2019    AST 13 11/15/2019    ALT 13 11/15/2019    MG 2.5 11/15/2019     Recent Labs   Lab 11/14/19  1635 11/15/19  0640    cysts the largest on the right measures 3.3 cm and on the left 4.4 cm. AORTA/VASCULAR:  No aneurysm or dissection. Marked vascular calcification. RETROPERITONEUM:  No mass or adenopathy.     BOWEL/MESENTERY:  Moderate diverticulosis of the sigmoid and l colonoscopy however pt is currently uninterested d/t dyspnea and weakness and is aware that potential neoplasm may not be identified   Recommendations:     1. Can consider a colonoscopy under MAC once cardio-pulmonary status improves.  Pt currently states h

## 2019-11-15 NOTE — PROGRESS NOTES
11/14/19 2321 11/14/19 2323   Vital Signs   Pulse 81 89   Heart Rate Source Monitor Monitor   Resp 20  --    BP (!) 187/77 (!) 170/99   BP Location Left arm Left arm   BP Method Automatic Automatic   Patient Position Lying Sitting   orthostatics.  Pt syed

## 2019-11-15 NOTE — PLAN OF CARE
9422 Call from lab, (+) BC form Aerobic bottle: Gram (+) cocci in chains, PCR: strptoccocus,  Dr Rajat Ruiz notified. Dr Rajat Ruiz called back, no antibiotic needed @ this time, ? Contaminated culture. Pt rec'd awake and alert.   C/O abdominal pain rating at 4/10

## 2019-11-15 NOTE — CONSULTS
[facility] [address] [place] [address] Cardiology  Report of Consultation    Hattie Arango Patient Status:  Inpatient    1929 MRN [id number]   [facility] 2NE-A Attending Aldo Blanco MD   Hosp Day # 1 PCP Sindy Dorman MD     Reason for Consultation: 50 mg Oral BID   • Metoprolol Succinate ER  50 mg Oral Daily Beta Blocker   • Potassium Chloride ER  20 mEq Oral BID   • torsemide  40 mg Oral BID (Diuretic)   • Insulin Aspart Pen  1-5 Units Subcutaneous TID CC and HS       Continuous Infusion:       PRN Insulin Aspart Pen 100 UNIT/ML Subcutaneous Solution Pen-injector, Inject 10 Units into the skin TID CC and HS., Disp: 1 pen, Rfl: 0  acetaminophen 325 MG Oral Tab, Take 2 tablets (650 mg total) by mouth every 4 (four) hours as needed. , Disp: , Rfl: 0  a Recent Labs   Lab 11/14/19  1635 11/15/19  0640   * 159*   BUN 55* 50*   CREATSERUM 2.78* 2.43*   GFRAA 22* 26*   GFRNAA 19* 23*   CA 9.0 8.6   ALB 2.9* 2.8*   * 140   K 4.4 4.4    102   CO2 26.0 32.0   ALKPHO 64 58   AST 24 13*   ALT

## 2019-11-16 NOTE — RESPIRATORY THERAPY NOTE
O2 ROUNDS- FOUND PT IN CHAIR, ON 2L NC SPO2 96%. RESPIRATIONS SOMEWHAT LABORED, PT EXPRESSED . HAS A CONGESTED NONPRODUCTIVE COUGH.  HE IS NOT SURE IF HE HAS COPD BUT DOES NOT TAKE ANY INHALERS/TREATMENTS AT HOME.      BS COARSE/RALES BILATERALLY WITH RH

## 2019-11-16 NOTE — PROGRESS NOTES
[facility]  Cardiology Progress Note    India Beck Patient Status:  Inpatient    1929 MRN [id number]   [facility] 2NE-A Attending Live Miller MD   Hosp Day # 2 PCP Ruby Bruno MD     Impression:  Assessment:  1.  Acute on dry    acetaminophen (TYLENOL) tab 650 mg, 650 mg, Oral, Q4H PRN  Alum & Mag Hydroxide-Simeth (MAALOX) oral suspension 30 mL, 30 mL, Oral, QID PRN  aspirin EC tab 81 mg, 81 mg, Oral, Daily  cholecalciferol (VITAMIN D3) cap/tab 1,000 Units, 1,000 Units, Ora CALCIUM      8.5 - 10.1 mg/dL 8.6   CALCULATED OSMOLALITY      275 - 295 mOsm/kg 307 (H)   eGFR NON-AFR.  AMERICAN      >=60 23 (L)   eGFR       >=60 26 (L)     Component      Latest Ref Rng & Units 11/14/2019 10/21/2019   pro-BETA NATRIUR

## 2019-11-16 NOTE — PLAN OF CARE
Assumed care for pt at 19:30 on 11/15    A&Ox4  Reports abd pain 4/10 relieved with tylenol. Initial BP elevated, stabilized with scheduled PO hydralazine. Received tylenol for temp 100.0  Other VSS on 2L, spO2 94%  SR on tele  Expiratory wheezes.  WARNER  PO baseline  Description  INTERVENTIONS:  - Continuous cardiac monitoring, monitor vital signs, obtain 12 lead EKG if indicated  - Evaluate effectiveness of antiarrhythmic and heart rate control medications as ordered  - Initiate emergency measures for life t

## 2019-11-16 NOTE — PLAN OF CARE
Pt rec'd Igor Miracle and alert, sitting in chair. Lungs diminsihed/ Rhonchi. Respiratory here giving tx,  Pt tolerating well. Pt denies any distress or discomfort at this time; reports discomfort with coughing.   Pt declining GI work-up aty htis time, d/t wea baseline  Description  INTERVENTIONS:  - Continuous cardiac monitoring, monitor vital signs, obtain 12 lead EKG if indicated  - Evaluate effectiveness of antiarrhythmic and heart rate control medications as ordered  - Initiate emergency measures for life t

## 2019-11-16 NOTE — PROGRESS NOTES
[facility]    Progress Note    Mariza Shin Patient Status:  Inpatient    1929 MRN [id number]   [facility] 2NE-A Attending Mathew Ashford MD   Hosp Day # 2 PCP Iris Bland MD         Subjective:   Mariza Shin is a(n) 80 yea lower extremity edema noted. Skin: Normal texture and turgor. Lymphatic:  No cervical lymphadenopathy.         Assessment and Plan:     Acute on chronic congestive heart failure, unspecified heart failure type ([facility]    CAD    CKD    Sp traumatic brain in Dictated by: Marisol Brown MD on 11/14/2019 at 17:22     Approved by: Marisol Brown MD on 11/14/2019 at 17:24          Ekg 12-lead    Result Date: 11/15/2019  Sinus rhythm with Possible Inferior infarct , age undetermined Abnormal ECG When compar

## 2019-11-16 NOTE — H&P
[address] Adri[facility] Patient Status:  Inpatient    1929 MRN [id number]   [facility] 2NE-A Attending Rigoberto Barrera MD   [address] Day # 1 PCP Augustine Carreon MD     Date:  11/15/2019  Date of Admission:   Take 500 mg by mouth nightly., Disp: , Rfl: , 11/14/2019 at Unknown time  hydrALAzine HCl 50 MG Oral Tab, Take 1 tablet (50 mg total) by mouth every 8 (eight) hours.  (Patient taking differently: Take 50 mg by mouth 2 (two) times daily.  ), Disp: 90 tablet, nightly., Disp: , Rfl: 0, Not Taking  ipratropium-albuterol 0.5-2.5 (3) MG/3ML Inhalation Solution, Take 3 mL by nebulization every 4 (four) hours as needed. , Disp: , Rfl: 0, Unknown at Unknown time        Review of Systems: Negative except for HPI      Ph 11/15/2019     11/15/2019    K 4.4 11/15/2019     11/15/2019    CO2 32.0 11/15/2019     11/15/2019    CA 8.6 11/15/2019    ALB 2.8 11/15/2019    ALKPHO 58 11/15/2019    BILT 0.4 11/15/2019    TP 6.5 11/15/2019    AST 13 11/15/2019    ALT

## 2019-11-17 NOTE — PLAN OF CARE
Assumed care for pt at 19:30 on 11/16    A&Ox4  Reports NIRALI, spO2 92-99% on 2L. Weak productive cough. Improvement after oral suction, Moderate thick yellow/clear sputum. VSS on 2L. SR on tele  Lungs coarse at bases. Congested sounding weak cough.  [address]

## 2019-11-17 NOTE — PHYSICAL THERAPY NOTE
PHYSICAL THERAPY EVALUATION - INPATIENT     Room Number: 4981/5575-C  Evaluation Date: 11/17/2019  Type of Evaluation: Initial  Physician Order: PT Eval and Treat    Presenting Problem: upper abdominal/lower chest pain  Reason for Therapy: Mobility D (rollator)  Patient Regularly Uses: Glasses; Home O2(at night on 2L)    Prior Level of [place]: Pt states ambulates with rollator at home, independent with bed mobility and transfers. SUBJECTIVE  \" I don't think I can do this today\".  RN and write railing?: A Lot       AM-PAC Score:  Raw Score: 15   Approx Degree of Impairment: 57.7%   Standardized Score (AM-PAC Scale): 39.45   CMS Modifier (G-Code): CK    FUNCTIONAL ABILITY STATUS  Gait Assessment   Gait Assistance: Dependent assistance(actual min outcome measures completed include AM PAC with pt demonstrating 57.7% degree of functional impairment and Elderly Mobility scale with pt's score at 3/20 which interprets as requiring help with basic ADLs is dependent of long term care.   Based on this evalu

## 2019-11-17 NOTE — PROGRESS NOTES
[facility]  Report of Consultation    Harvey Brown Patient Status:  Inpatient    1929 MRN [id number]   [facility] 2NE-A Attending Sylvester Kirkland MD   [facility] Day # 3 PCP Gayathri Burnette MD     Date of Admission:  2019  Date of femur Fx w/nata   • TONSILLECTOMY       No family history on file. reports that he quit smoking about 3 years ago. His smoking use included cigarettes. He has a 20.00 pack-year smoking history.  He has never used smokeless tobacco. He reports previous (36.6 °C), temperature source Oral, resp. rate 18, height 172.7 cm (5' 8\"), weight 86 kg (189 lb 9.5 oz), SpO2 99 %.     Laboratory Data:  Lab Results   Component Value Date    CREATSERUM 2.81 11/17/2019    BUN 74 11/17/2019     11/17/2019    K 5.0 1 opioids and bowel regimen with pt  4. Tizanidine 2 mg po TID prn    If regimen does not manage pain, would recommend considering oral corticosteroid or amitriptyline. Pt verbalizes understanding of POC. RN updated on orders.     Pt can follow-up with ou

## 2019-11-17 NOTE — PLAN OF CARE
Patient remains on 3L NC overnight. Breath sounds have been clear to coarse this shift and patient has a congested cough. Tolerating Mask CPAP Tx well and will continue for now.

## 2019-11-17 NOTE — PROGRESS NOTES
[facility]    Progress Note    Olga Lazaro Patient Status:  Inpatient    1929 MRN [id number]   [facility] 2NE-A Attending Osvaldo Wood MD   Hosp Day # 3 PCP Evon Stone MD         Subjective:   Olga Lazaro is a(n) 80 yea lower extremity edema noted. Skin: Normal texture and turgor. Lymphatic:  No cervical lymphadenopathy.         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       Dajuan Greer MD  11/17/2019        Dajuan Greer  11/17/2019  10:34 AM

## 2019-11-17 NOTE — PLAN OF CARE
Pt rec'd sleeping, easy to arouse. Pt denies any discomfort or distress except when coughing. Respiratory therapy giving tx while awake, pt tolerating well. Pt up in chair for breakfast.  Teklemetry showing SR. Silva continue to monitor.     Problem: Diab ordered  - Initiate emergency measures for life threatening arrhythmias  - Monitor electrolytes and administer replacement therapy as ordered  Outcome: Progressing     Problem: METABOLIC/FLUID AND ELECTROLYTES - ADULT  Goal: Electrolytes maintained within

## 2019-11-17 NOTE — PROGRESS NOTES
[facility]  Cardiology Progress Note    Estill Blizzard Patient Status:  Inpatient    1929 MRN [id number]   [facility] 2NE-A Attending Rigoberto Barrera MD   Hosp Day # 3 PCP Augustine Carreon MD     Impression:  Assessment:  1.  Acute on sodium (COLACE) cap 100 mg, 100 mg, Oral, BID  ipratropium-albuterol (DUONEB) nebulizer solution 3 mL, 3 mL, Nebulization, 6 times per day  guaiFENesin (ROBITUSSIN) 100mg/5ml LIQUID 200 mg, 200 mg, Oral, Q4H PRN  acetaminophen (TYLENOL) tab 650 mg, 650 mg, CALCULATED OSMOLALITY      275 - 295 mOsm/kg 307 (H)   eGFR NON-AFR.  AMERICAN      >=60 23 (L)   eGFR       >=60 26 (L)     Component      Latest Ref Rng & Units 11/14/2019 10/21/2019   pro-BETA NATRIURETIC PEPTIDE      <450 pg/mL 5,992 (

## 2019-11-18 PROBLEM — Z51.5 PALLIATIVE CARE ENCOUNTER: Status: ACTIVE | Noted: 2019-01-01

## 2019-11-18 PROBLEM — Z71.89 GOALS OF CARE, COUNSELING/DISCUSSION: Status: ACTIVE | Noted: 2019-01-01

## 2019-11-18 NOTE — PROGRESS NOTES
[address] [facility] Patient Status:  Inpatient    1929 MRN [id number]   [facility] 2NE-A Attending Gurwinder Esquivel MD   Hosp Day # 4 PCP MD Yemi Dickens is a 80year old male patient.     Patient Active Muscle weakness     right sided   • Renal disorder    • Shortness of breath    • Stroke [facility])     pt reports 20 yrs ago   • Traumatic subarachnoid hemorrhage (HCC)    • Visual impairment     glasses       No current outpatient medications on file.        No

## 2019-11-18 NOTE — PROGRESS NOTES
[address] [facility] Cardiology  Progress Note    Anum Mayer Patient Status:  Inpatient    1929 MRN [id number]   [facility] 2NE-A Attending Isidoro He MD   Hosp Day # 4 PCP Chelle Ross MD     Subjective:   No chest pain.   No kg)      Allergies:  No Known Allergies    Physical Exam:   General:  Well-developed / Well-nourished. No acute distress. HEENT:  Normocephalic. Atraumatic. No icterus. Neck:  There is no jugular venous distention.    Cardiovascular:  Cardiovascular ex Labs   Lab 11/14/19  1635   TROP <0.045         Tele: SR    Diagnostics:    Echo 10/19:  Conclusions:     1. Left ventricle: The cavity size was normal. Wall thickness was mildly     increased.  Systolic function was normal. The estimated ejection fraction date.    Plan:   1. PO Torsemide  2. F/U BMP  3. Hold on further Bumex  4. DAPT  5.  BB  6.  Hydralazine  7. Palliative care evaluation - establishing goals of care with family  8. Hold K+  9. Wean O2 as able.     Joe Cooper MD  11/18/2019  8:39

## 2019-11-18 NOTE — CONSULTS
[address] [place] Patient Status:  Inpatient    1929 MRN [id number]   [facility] 2NE-A Attending Corinne Story MD   [facility] Day # 4 PCP Ramakrishna Ibarra MD     Date of Consult: 19   Hos disease) [facility])    • Coronary atherosclerosis    • Diabetes [facility])    • Essential hypertension    • Gout    • Heart attack ([facility] [address]    • High blood pressure    • High cholesterol    • Hyperlipidemia    • Muscle weakness     right sided   • Renal disorder    • S (OXY-IR) cap/tab 5 mg, 5 mg, Oral, Q4H PRN  •  tiZANidine HCl (ZANAFLEX) tab 2 mg, 2 mg, Oral, Q6H PRN  •  lidocaine-menthol 4-1 % 1 patch, 1 patch, Transdermal, Daily  •  docusate sodium (COLACE) cap 100 mg, 100 mg, Oral, BID  •  ipratropium-albuterol (DU that helped his back pain were steroid epidural injections.  ---------  Appetite:  Good, at baseline. Last BM:  States no bm in at least a few days. Sleep disturbance:  denied.     Review of pertinent medication requirements in past 24 hours:      Ace Stable bilateral calcified pleural plaques. Dictated by: Donny Schilder, DO on 11/17/2019 at 7:49     Approved by:  Donny Schilder, DO on 11/17/2019 at 7:53            Vital Signs:  Blood pressure (!) 161/47, pulse 55, temperature 97.5 °F (36.4 °C), temperat consultation.  I discussed the benefits of palliative care to include help with arising symptom management needs, provide extra layer of support to patient/family, facilitate GOC/Advanced Care Planning discussions, support with complex decision-making, to e thoughts on the hospitalization, possible finding of cancer, and how he wishes to proceed - reviewing options for diagnostic workup and possible procedures vs focusing on his pain/comfort.  Dtr Catina Park advised Kell Mcneal that without workup, [a likely] abdominal ca was helpful. He had not used any doses of the Oxy-IR at the time of my visit, and he declined the offer for RN to provide PRNs during my visit. Mirtha Kimbrough and Vasile Quiles are agreeable to [facility] following up with him while hospitalized for support.  Both understand diff vasovagal     Elevated troponin     Subarachnoid hemorrhage following injury with brief loss of consciousness but without open intracranial wound ([facility] [address]     Type 2 diabetes mellitus with stage 3 chronic kidney disease, without long-term current use of insul following:  Direct face-to-face contact, history taking, physical examination, and > 50% was spent counseling and coordinating care. I discussed today's visit with Adrianna Payan and Dr. Alessandra Middleton.     Thank you for inviting Palliative Care Service to participate

## 2019-11-18 NOTE — PLAN OF CARE
Rec'd pt awake and alert. Lungs diminshed, on 2l via n/c. Pt up in chair for most of day. Ongoing discomfort to lower back noted, pt greed to try medication. Pt appears to soem relief at this time.  Pallative care met with pt and he wants to have [place] Continuous cardiac monitoring, monitor vital signs, obtain 12 lead EKG if indicated  - Evaluate effectiveness of antiarrhythmic and heart rate control medications as ordered  - Initiate emergency measures for life threatening arrhythmias  - Monitor electro

## 2019-11-18 NOTE — PLAN OF CARE
Obtained pt at 1930. Pt is a/o. Lungs are diminished on 2.5 L of O2. HR is NSR on telemetry. 1+ edema noted on LE. Bowel sounds are present on all four quadrants. Pt denies any SOB or chest pain. Pt is laying in bed and call light is within reach.   Will co heart rate control medications as ordered  - Initiate emergency measures for life threatening arrhythmias  - Monitor electrolytes and administer replacement therapy as ordered  Outcome: Progressing     Problem: METABOLIC/FLUID 0151 Edyta Lewis

## 2019-11-19 NOTE — CM/SW NOTE
Case discussed in rounds, pt may be ready for d/c soon. YESSICA met with patient to confirm that he lives at [place] and had been at their [address] prior to arrival. Pt plans to return to [facility]. YESSICA sent updates and notified Noemy Parents, liaison for [place].  Wi

## 2019-11-19 NOTE — PLAN OF CARE
Pt is AOx4, denies chest pain or dyspnea. 2L nasal cannula. SR on tele. Up with assit of 1/sit to stand. QID accucheck. Voids. Will monitor. : Yes

## 2019-11-19 NOTE — PROGRESS NOTES
[facility] Cardiology  Progress Note    Ellen Hernandez Patient Status:  Inpatient    1929 MRN [id number]   [facility] 2NE-A Attending Nima Norman MD   Hosp Day # 5 PCP Kyleigh Hernandez MD     Subjective:   No chest pain.   No kg)  10/07/19 : 203 lb 3.2 oz (92.2 kg)      Allergies:  No Known Allergies    Physical Exam:   General:  Well-developed / Well-nourished. No acute distress. HEENT:  Normocephalic. Atraumatic. No icterus. Neck:  There is no jugular venous distention. 30.5*   RDW 13.8 13.8 13.8   NEPRELIM 7.69  --  9.34*   WBC 10.4 9.1 12.2*   .0 229.0 268.0       No results for input(s): PTP, INR in the last 168 hours.     Recent Labs   Lab 11/14/19  1635   TROP <0.045         Tele:     Diagnostics:    Echo 10/  SAH 10/19               -Now back on DAPT  8.  KUSH on CRI               -Stable  9.  COPD  10. Abdominal CT with thickening at splenic flecture. Anemia present. Pt has declined colonoscopy to date. Plan:   1. Reduce PO Torsemide  2. F/U BMP  3.   O

## 2019-11-19 NOTE — PLAN OF CARE
Pt A&Ox4, VSS, afebrile, c/o low back pain, denies abd pain at this time. O2 sat 97% on 2 LNC, LS diminished, pt denies shortness of breath. Pt does not want colonoscopy done when asked by this RN. Pt from [address] [place] [address] Dr, PT recommends YURY.

## 2019-11-19 NOTE — PROGRESS NOTES
Gastroenterology Progress Note  Severiano Ching Patient Status:  Inpatient    1929 MRN [id number]   [facility] [place] 2NE-A Attending Ivana Paez MD   Hosp Day # 5 PCP Dom Mallory MD     [facility] Dose information is transmitted to the [facility] of Radiology) NRDR ([address] [place] [address]) which includes the Dose Index Registry.      PATIENT STATED HISTORY: (As transcribed by Technologist)  Patient is here with upper abdominal =====  CONCLUSION:    1. There is a 5 cm segment of circumferential wall thickening involving the splenic flexure without diverticula with slight infiltration of the fat.   The differential would include colonic neoplasm, short segment colitis, diverticul

## 2019-11-19 NOTE — PROGRESS NOTES
[address] [facility] 149 Follow Up    [facility] Patient Status:  Inpatient    1929 MRN [id number]   [facility] 2NE-A Attending Tammi Frederick MD   [facility] Day # 5 PCP Marciano Hendrix MD     Date of visit:  2019  Day 5 of disease  Normal Full Normal Full   80 Full Some disease  Normal w/effort Full Normal or  Reduced Full   70 Reduced Some disease  Can't perform job Full Normal or   Reduced Full   60 Reduced Significant disease  Can't perform hobby Occasional  Assist Normal stated he needs to start thinking about that. He confirmed again that he did not want the test to confirm the pathology of the mass.  I supported his wish again and advised that my role is to help him achieve the best QOL in alignment with what his individu discussions and decision-making over the past 2 days. ADVANCED CARE PLANNING COUNSELING/DISCUSSION:    CODE STATUS/POLST: DNR / DNI - Comfort-focused treatment reflected on previously completed POLST on file in Epic.   Consider revising pending ongoing G acute pain service. No additions from our service at this time. Goals of Care - Established. Agreeable to community PC to follow for GOC/sx, support with eventual bridge to hospice. Order placed to  to arrange upon DC to [place] [employer].     CODE STATUS/

## 2019-11-20 NOTE — PLAN OF CARE
Patient received on 2 liter nasal cannula sating 94%. Breath sounds- diminished/slightly coarse on right/crackles RLL. Patient reaching 1,000 ml on IS. Plan to decrease nebs to QID.

## 2019-11-20 NOTE — CM/SW NOTE
Bedside RN informed me that discharge plan for today is cancelled; now concern for stroke, Neurology consulted. ARMIN Guallpa from [facility]. Will follow.       Rodolfo England RN, MSN, APN, CTL   Clinical Transitions Leader  [phone]

## 2019-11-20 NOTE — CONSULTS
Neurology H&P    Quin Ambrocio Patient Status:  Inpatient    1929 MRN [id number]   [facility] 2NE-A Attending Vernon Michel MD   Hosp Day # 6 PCP Vish Diaz MD     Subjective:  Quin Ambrocio is a(n) 80year old male with a PMH si artery disease involving native coronary artery of native heart without angina pectoris     Acute on chronic congestive heart failure, unspecified heart failure type [facility])     Palliative care encounter     Goals of care, counseling/discussion     Chronic b mucus membranes  Neck: Supple  Cardiovascular: Regular rate and rhythm, no murmur  Pulm: CTAB  GI: non-tender, normal bowel sounds  Skin: normal, dry  Extremities: No clubbing or cyanosis      Neurologic:   MENTAL STATUS: alert, ox3, normal attention, lang Neurology was consulted as patient reports R hand clumsiness at some time this am. No symptoms at this time and he has no complaints. It is not clear that this was actually a stroke or TIA. Sympotms are very vague.  Tells me that he dropped his coffee cream

## 2019-11-20 NOTE — PROGRESS NOTES
[address] [employer] Cardiology  Progress Note    Estefanía Lopez Patient Status:  Inpatient    1929 MRN [id number]   [facility] 2NE-A Attending Hunter Farias MD   Hosp Day # 6 PCP Dank Hansen MD     Subjective:   No chest pain.   No 203 lb 3.2 oz (92.2 kg)      Allergies:  No Known Allergies    Physical Exam:   General:  Well-developed / Well-nourished. No acute distress. HEENT:  Normocephalic. Atraumatic. No icterus. Neck:  There is no jugular venous distention.    Cardiovascular 13.8 13.8 13.8   NEPRELIM 7.69  --  9.34*   WBC 10.4 9.1 12.2*   .0 229.0 268.0       No results for input(s): PTP, INR in the last 168 hours.     Recent Labs   Lab 11/14/19  1635   TROP <0.045         Tele:     Diagnostics:    Echo 10/19:  Conclus on DAPT  8.  KUSH on CRI               -Stable  9.  COPD  10. Abdominal CT with thickening at splenic flecture. Anemia present. Pt has declined colonoscopy to date. Plan:   1. PO Torsemide - may require outpatient titration. Monitor volume status.

## 2019-11-20 NOTE — PLAN OF CARE
Assumed care of pt at 1. Pt alert and oriented x4, saturating well on 2L NC, SR on tele. Has mild pain in lower back, scheduled tylenol given. Torsemide to start tomorrow. Daily weights and fluid restriction. Pt updated on POC.  Will continue to mon control medications as ordered  - Initiate emergency measures for life threatening arrhythmias  - Monitor electrolytes and administer replacement therapy as ordered  Outcome: Progressing     Problem: METABOLIC/FLUID AND ELECTROLYTES - ADULT  Goal: Electrol

## 2019-11-20 NOTE — CM/SW NOTE
Pt has agreed to have palliative care follow up at [facility]. Discussed with Idris Dale liaison, who said to [facility] to [facility] has accepted and will provide palliative care.         [facility] SNF  [address] [facility]  Ph

## 2019-11-20 NOTE — PROGRESS NOTES
[facility] Patient Status:  Inpatient    1929 MRN [id number]   [facility] 2NE-A Attending Osvaldo Wood MD   Hosp Day # 6 PCP Evon Stone MD     Olga Lazaro is a 80year old male patient.     Patient Active atherosclerosis    • Diabetes [facility])    • Essential hypertension    • Gout    • Heart attack ([facility] [address]    • High blood pressure    • High cholesterol    • Hyperlipidemia    • Muscle weakness     right sided   • Renal disorder    • Shortness of breath    • Strok

## 2019-11-20 NOTE — PLAN OF CARE
PCT notified this RN that pt c/o having just had a stroke. She noted his right arm appeared weak and pt right foot keeping dropping to the floor \" meaning he could not lift it up\".    Upon my assessment pt appeared AOX3 able to lift his right arm at it ba

## 2019-11-21 NOTE — PROGRESS NOTES
11/21/19 1100   Clinical Encounter Type   Visited With Patient   Routine Visit Introduction   Continue Visiting No   Patient's Supportive Strategies/Resources   ( acknowledged that gamaliel and family are important to patient.)   Patient Spiritual

## 2019-11-21 NOTE — PLAN OF CARE
AOX4, 97% on 2L, no complains of chest pain or SOB, no complaints of right hand weakness, NSR on tele, VSS, safety precautions in place; bed in lowest position, call light within reach, anti-slip socks placed. MRI brain negative.  Torsemide increased 40 mg antiarrhythmic and heart rate control medications as ordered  - Initiate emergency measures for life threatening arrhythmias  - Monitor electrolytes and administer replacement therapy as ordered  Outcome: Progressing     Problem: METABOLIC/FLUID AND ELECTR

## 2019-11-21 NOTE — PLAN OF CARE
Assumed pt care at 1500. Pt reportedly thinks he had a mini stroke about 1200 today when he fumbled his coffee creamer and had trouble gripping urinal/bedrail. Sx have subsided; neuro consult - MRI ordered. Pt awaiting MRI.   Denies any deficits; pt's le

## 2019-11-21 NOTE — PROGRESS NOTES
[facility] Patient Status:  Inpatient    1929 MRN [id number]   [facility] 2NE-A Attending Tomy Pereira MD   Hosp Day # 7 PCP MD Kirk Morganjong Acuña is a 80year old male patient.     Patient Active Coronary atherosclerosis    • Diabetes [facility])    • Essential hypertension    • Gout    • Heart attack ([facility] [address]    • High blood pressure    • High cholesterol    • Hyperlipidemia    • Muscle weakness     right sided   • Renal disorder    • Shortness of breath equal, round, and reactive to light. 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 Cardiology following for CHF.). Plan:   (As above.  MRI to be do

## 2019-11-21 NOTE — PHYSICAL THERAPY NOTE
Physical Therapy    Attempted to treat pt this p.m., but pt remains at MRI. Will re-attempt as schedule allows.

## 2019-11-21 NOTE — PROGRESS NOTES
[facility] Patient Status:  Inpatient    1929 MRN [id number]   [facility] 2NE-A Attending Tammi Frederick MD   Hosp Day # 7 PCP MD Walter Eaton is a 80year old male patient.     Patient Active Coronary atherosclerosis    • Diabetes [facility])    • Essential hypertension    • Gout    • Heart attack ([facility] [address]    • High blood pressure    • High cholesterol    • Hyperlipidemia    • Muscle weakness     right sided   • Renal disorder    • Shortness of breath Patient is alert and oriented to person, place and time. Skin:  Warm and dry. Assessment:  (80 year old with CHF., Weakness of right side. , hx of recent [facility] and Colonic mass who has also Burning on urination has opted for Conservative and palli

## 2019-11-21 NOTE — PROGRESS NOTES
[address] [facility] Cardiology  Progress Note    Kunal Cruz Patient Status:  Inpatient    1929 MRN [id number]   [facility] 2NE-A Attending Kirstie Horn MD   Hosp Day # 7 PCP Orlin Hathaway MD     Subjective:   No chest pain.   Dys 1700 ml   Net -1310 ml       Wt Readings from Last 3 Encounters:  11/21/19 : 185 lb 1.6 oz (84 kg)  10/26/19 : 196 lb 3.4 oz (89 kg)  10/07/19 : 203 lb 3.2 oz (92.2 kg)      Allergies:  No Known Allergies    Physical Exam:   General:  Well-developed / Well RBC 3.38* 3.35* 3.56*   HGB 9.1* 8.9* 9.7*   HCT 30.0* 29.1* 31.8*   MCV 88.8 86.9 89.3   MCH 26.9 26.6 27.2   MCHC 30.3* 30.6* 30.5*   RDW 13.8 13.8 13.8   NEPRELIM 7.69  --  9.34*   WBC 10.4 9.1 12.2*   .0 229.0 268.0       No results for input( of past infarct,  However no provoked ischemia. 4.  HTN  5.  HL  6.  DM  7.  SAH 10/19               -Now back on DAPT  8.  KUSH on CRI               -Stable  9.  COPD  10. Abdominal CT with thickening at splenic flecture. Anemia present.   Pt has decline

## 2019-11-21 NOTE — PLAN OF CARE
Assumed care of Pt. At [address] [facility]. Pt. Is AxOx4 and on 2 L's of O2 with O2 sat of 94%. Neuro assessment is intact (see Flowsheets). Pt. Does have residual R sided weakness from prior stroke prior to admission. Pt. Was observed by this RN to have tachypnea.  Pt. S swelling of LE   Outcome: Progressing  Goal: Patient/Family Short Term Goal  Description  Patient's Short Term Goal: relief of abdominal discomfort  Interventions:   - GI workup    Outcome: Progressing     Problem: CARDIOVASCULAR - ADULT  Goal: Maintains o and patient weight  - Monitor urine specific gravity, serum osmolarity and serum sodium as indicated or ordered  - Monitor response to interventions for patient's volume status, including labs, urine output, blood pressure (other measures as available)  -

## 2019-11-22 NOTE — PHYSICAL THERAPY NOTE
PHYSICAL THERAPY TREATMENT NOTE - INPATIENT    Room Number: 7406/6771-V     Session: 1   Number of Visits to Meet Established Goals: 5    Presenting Problem: upper abdominal/lower chest pain  History related to current admission: Pt is 80year old male ad TONSILLECTOMY         SUBJECTIVE  \"I'll walk\"    Patient’s self-stated goal is to go to rehab.     OBJECTIVE  Precautions: None    WEIGHT BEARING RESTRICTION  Weight Bearing Restriction: None                PAIN ASSESSMENT   Rating: (does not rate)  Locat below. Pt 02 sats on 1 l/min 94 %, pt removed to blow nose, pt sats remain 92-94% during seated ex. Sit to stand with min assist up to r/w.  Gait x 20 ft with r/w and close CGA/min assist. Pt returns to chair and seated with min assist. Cueing for hand plac

## 2019-11-22 NOTE — PROGRESS NOTES
[address] [facility] [place] Patient Status:  Inpatient    1929 MRN [id number]   [facility] 2NE-A Attending Sylvester Kirkland MD   [facility] Day # 8 PCP MD Harvey Vargas is a 80year old male patient.     Patient Active Coronary atherosclerosis    • Diabetes [facility])    • Essential hypertension    • Gout    • Heart attack ([facility] [address]    • High blood pressure    • High cholesterol    • Hyperlipidemia    • Muscle weakness     right sided   • Renal disorder    • Shortness of breath

## 2019-11-22 NOTE — PLAN OF CARE
11/21/2019      Pt had 13 beats of V tach at shift change ans 11 beats of V tach at 2130. Notified Dr. Birdie Cullen. Per MD, keep monitoring. Pt is asymptomatic with no complaints at this time. Pt resting in bed.  Will cont to monitor    Problem: Diabetes/Glucose Initiate emergency measures for life threatening arrhythmias  - Monitor electrolytes and administer replacement therapy as ordered  Outcome: Progressing     Problem: METABOLIC/FLUID AND ELECTROLYTES - ADULT  Goal: Electrolytes maintained within normal limi

## 2019-11-22 NOTE — PLAN OF CARE
Aox4, 96% on 2L, no complaints of chest pain or SOB, VSS, NSR on tele, up in chair, lidocaine patch for back pain, mag ordered, safety precautions in place; bed in lowest position, call light within reach, anti-slip socks placed.  Will continue to monitor t Continuous cardiac monitoring, monitor vital signs, obtain 12 lead EKG if indicated  - Evaluate effectiveness of antiarrhythmic and heart rate control medications as ordered  - Initiate emergency measures for life threatening arrhythmias  - Monitor electro

## 2019-11-22 NOTE — PROGRESS NOTES
[place] [address] [facility] Cardiology  Progress Note    lElen Hernandez Patient Status:  Inpatient    1929 MRN [id number]   [facility] 2NE-A Attending Nima Norman MD   Hosp Day # 8 PCP Kyleigh Hernandez MD     Subjective:   No chest pain.   No from Last 3 Encounters:  11/22/19 : 184 lb 1.4 oz (83.5 kg)  10/26/19 : 196 lb 3.4 oz (89 kg)  10/07/19 : 203 lb 3.2 oz (92.2 kg)      Allergies:  No Known Allergies    Physical Exam:   General:  Well-developed / Well-nourished. No acute distress.   HEENT: diagnostic evidence for regional wall motion     abnormalities. Features are consistent with a pseudonormal left     ventricular filling pattern, with concomitant abnormal relaxation and     increased filling pressure - grade 2 diastolic dysfunction.   2. R Stefani Jimenez MD  11/22/2019

## 2019-11-22 NOTE — CM/SW NOTE
Per unit RN, pt can d/c today. SW notified Lucy Overton with [facility] NH and arranged for 4:30pm ambulance. Unit RN will notify pt's daughter.        Yu PM#[phone] for nurse report        SW also notified [facility] fo d/c today as they will follow u

## 2019-11-23 NOTE — DISCHARGE SUMMARY
[facility]    Discharge Summary    Severiano Ching Patient Status:  Inpatient    1929 MRN [id number]   [facility] 2NE-A Attending No att. providers found   [facility] Day # 8 PCP Dom Mallory MD     Date of Admission: 2019 Dispo Eyes:    PERRL, conjunctiva/corneas clear, EOM's intact, fundi     benign, both eyes        Ears:    Normal TM's and external ear canals, both ears   Nose:   Nares normal, septum midline, mucosa normal, no drainage    or sinus tenderness   Throat:   Lips lasix in ED. CT ABD/PELVIS:There is a 5 cm segment of circumferential wall thickening involving the splenic flexure w/o diverticula w/ slight infiltration of the fat. The differential would include colonic neoplasm, short segment colitis, diverticulitis. nightly. Quantity:  90 tablet  Refills:  3     Clopidogrel Bisulfate 75 MG Tabs  Commonly known as:  PLAVIX  What changed:  when to take this      Take 1 tablet (75 mg total) by mouth daily.    Stop taking on:  February 19, 2020  Quantity:  90 tablet  Ref (two) times daily. Refills:  0     torsemide 20 MG Tabs  Commonly known as:  DEMADEX      Take 2 tablets (40 mg total) by mouth BID (Diuretic).    Quantity:  120 tablet  Refills:  1     triamcinolone acetonide 0.1 % Oint  Commonly known as:  KENALOG

## 2019-12-02 PROBLEM — M54.16 LUMBAR RADICULITIS: Status: ACTIVE | Noted: 2019-01-01

## 2019-12-05 PROBLEM — D64.9 ANEMIA, UNSPECIFIED TYPE: Status: ACTIVE | Noted: 2019-01-01

## 2019-12-05 PROBLEM — K92.1 MELENA: Status: ACTIVE | Noted: 2019-01-01

## 2019-12-05 PROBLEM — R79.89 AZOTEMIA: Status: ACTIVE | Noted: 2019-01-01

## 2019-12-05 PROBLEM — R73.9 HYPERGLYCEMIA: Status: ACTIVE | Noted: 2019-01-01

## 2019-12-05 PROBLEM — I25.10 CORONARY ARTERY DISEASE INVOLVING NATIVE HEART WITHOUT ANGINA PECTORIS: Status: ACTIVE | Noted: 2019-01-01

## 2019-12-05 NOTE — H&P
[facility]  History and Physical     Susan Flavin Patient Status:  Emergency    1929 MRN [id number]   Location [facility] [facility] Attending Smith Ladd MD   Hosp Day # 0 PCP Vincent Shay MD     Chief Complain reviewed. No pertinent family history. Allergies: No Known Allergies    Medications:  No current facility-administered medications on file prior to encounter.    atorvastatin 40 MG Oral Tab, Take 1 tablet (40 mg total) by mouth nightly., Disp: 90 tablet Disp: , Rfl:   cholecalciferol 1000 units Oral Cap, Take 1,000 Units by mouth daily. , Disp: , Rfl:         Review of Systems:   A comprehensive 14 point review of systems was completed. Pertinent positives and negatives noted in the HPI.     Physical E on tele  4. Trend troponin  4. Ess HTN  5. Dyslipidemia  1. Hold statin until taking PO  6. Colon Cancer  1. GI to eval  2. Pt does not want colonoscopy  7. COPD  1. Continue home nebs  2. O2  8. CHF  1. Continue diuretics  2.  Monitor for overload given tr

## 2019-12-05 NOTE — ED PROVIDER NOTES
Patient Seen in: [facility] Emergency Department      History   Patient presents with:  Abnormal Result    Stated Complaint: Hgb 7.0, Creat 2.5 drawn on 12/5    HPI  77-year-old male presents from nursing home for low hemoglobin of 7.   Patient has h Never Used    Alcohol use: Not Currently      Binge frequency: Less than monthly      Comment: socially \"only at [employer]    Drug use: Never             Review of Systems   All other systems reviewed and are negative.       Positive for stated complaint: No rash. Neurological:      General: No focal deficit present. Mental Status: He is alert. Mental status is at baseline.           ED Course     Labs Reviewed   COMP METABOLIC PANEL (14) - Abnormal; Notable for the following components:       Result SCREEN.   Procedure                               Abnormality         Status                     ---------                               -----------         ------                     ABORH (BLOOD XZTW)[[id number]]                               Final result

## 2019-12-06 NOTE — PROGRESS NOTES
[facility]  Progress Note     [facility] Status:  [facility]    1929 MRN [id number]   [facility] 8NE-A Attending Danilo Smart MD   Hosp Day # 1 PCP Annette Godwin MD     Chief Complaint: Anemia    S: Patient witho 0.242* 1.070*            Imaging: Imaging data reviewed in Epic.     Medications:   • Isosorbide Mononitrate ER  30 mg Oral Daily   • lidocaine-menthol  1 patch Transdermal Q24H   • Metoprolol Succinate ER  50 mg Oral Daily Beta Blocker   • torsemide  40 mg

## 2019-12-06 NOTE — PLAN OF CARE
Received patient at 0730. Alert and Oriented x3, drowsy, but woken easily to name. Tele Rhythm NSR. O2 saturation 96% on 3 L via NC. Breath sounds diminished. Bed is locked and in low position. Call light and personal items within reach.  No C/O chest pain physical needs  - Identify cognitive and physical deficits and behaviors that affect risk of falls.   - [place] fall precautions as indicated by assessment.  - Educate pt/family on patient safety including physical limitations  - Instruct pt to call for a

## 2019-12-06 NOTE — PROGRESS NOTES
[facility] Note:  Renal Dose Adjustment for Metoclopramide (REGLAN)    Kell Wright has been prescribed Metoclopramide (REGLAN) 10 mg every 8 hours as needed for n/v.    Estimated Creatinine Clearance: 16.6 mL/min (A) (based on SCr of 2.86 mg/dL (H)).

## 2019-12-06 NOTE — PROGRESS NOTES
Patient arrived via stretcher. Patient has 1 unit of PRBCs infusing and was receiving oxygen via nasal cannula at 2 L/min NC. Patient was transferred to from the cart via bed. The bed is locked and in the low position.   Patient call light and telephone wer

## 2019-12-06 NOTE — PLAN OF CARE
Patient is A&Ox4, NSR on tele. VSS. Blood transfusing at shift change. Patient educated on signs and symptoms to make RN aware of. Denies SOB and palpitations. Does complain of lower back pain, chronic. Pain is brought on by movement.  Sacrum pink, skin ope physical needs  - Identify cognitive and physical deficits and behaviors that affect risk of falls.   - [place] fall precautions as indicated by assessment.  - Educate pt/family on patient safety including physical limitations  - Instruct pt to call for a

## 2019-12-06 NOTE — CONSULTS
[address] [facility]  [id number]  Hospital Day #1  Date of Consult:   12/6/2019        Reason for Consultation:      Consult requested by Dr. Tadeo Peters for evaluation of palliative care needs,  Goals of ca impairment     glasses     Past Surgical History:   Procedure Laterality Date   • CABG     • CATARACT     • CATH PERCUTANEOUS  TRANSLUMINAL CORONARY ANGIOPLASTY  11/2018    w/stents x2   • COLONOSCOPY     • FRACTURE SURGERY Right     femur Fx w/nata   • TON Coags:  Lab Results   Component Value Date    INR 1.09 12/05/2019    PTT 32.3 12/05/2019     Chemistry:  Lab Results   Component Value Date    CREATSERUM 2.64 (H) 12/05/2019     (H) 12/05/2019     12/05/2019    K 4.3 12/05/2019     or  Reduced Full   70 Reduced Some disease  Can’t perform job Full Normal or   Reduced Full   60 Reduced Significant disease  Can’t perform hobby Occasional  Assist Normal or   Reduced Full or confused   50 Mainly sit/lie Extensive Disease  Can’t do any wo for a comfort approach. He is declining procedures and aggressive management. When Katie Garcia talks about how he wants to be taken care of he refers back to having his pain managed and no testing.  Shirly Rinne stated I believe we are also ready for a comfort approach t Care Planning counseling and discussion:  Patient's preference about sharing medical information: his children  Patient's decision making preferences: himself and children    POLST/CODE STATUS: DNR/DNI with comfort care       HCPOA: Daughter, Ursula Perdomos

## 2019-12-06 NOTE — CONSULTS
[facility] [address] [place] [address] Cardiology  Report of Consultation    Greg Valentine Patient Status:  Inpatient    1929 MRN [id number]   [facility] 8NE-A Attending Meme Shipman MD   Hosp Day # 1 PCP Madhuri Loving MD     Reason for [facility] Traumatic subarachnoid hemorrhage (HCC)    • Visual impairment     glasses       Social History:   Smoking:  None  Alcohol:  None    Family History:   No family history of premature arthrosclerotic heart disease     Medications:   Scheduled:   • ferrous puckett Rfl:   metFORMIN HCl 500 MG Oral Tab, Take 500 mg by mouth nightly., Disp: , Rfl:   torsemide 20 MG Oral Tab, Take 2 tablets (40 mg total) by mouth BID (Diuretic). , Disp: 120 tablet, Rfl: 1  aspirin 81 MG Oral Tab EC, Take 1 tablet (81 mg total) by mouth d apical impulse. Lungs: Coarse ascultation bilaterally. No focal rhonchi or wheezes. Few rales. Good air movement is noted throughout all lung fields. Abdomen: Soft. Non-distended. Non-tender. Bowel sounds are present and normoactive.   No guarding o  COPD  10. NL LV function  11. Diastolic CHF      Plan:  1. Stop Plavix at this point  2. Ec ASA if cleared by primary Svc / GI  3. Reduce diuretic  4. D/C IV fluids  5.  BB  6. Empiric NTG  7. Hydralazine  8.   Recommend establishing goals of care

## 2019-12-06 NOTE — OCCUPATIONAL THERAPY NOTE
Received order for OT evaluation. Elevated troponin 1.07. Will continue to follow. Will initiate therapy once the activity level is clarified.

## 2019-12-06 NOTE — CONSULTS
[facility]                       [facility]enter[facility] Consultation[facility]    [facility] Patient Status:  Inpatient    1929 MRN [id number]   [facility] 8NE-A Attending Cristi Bah MD   [address] [facility] [address] [facility] [address] Day # 1 PCP [place] Laterality Date   • CABG     • CATARACT     • CATH PERCUTANEOUS  TRANSLUMINAL CORONARY ANGIOPLASTY  11/2018    w/stents x2   • COLONOSCOPY     • FRACTURE SURGERY Right     femur Fx w/nata   • TONSILLECTOMY       Medications: peppermint oil liquid 10 mL, 10 bruising, frequent gum bleeding or nose bleeding; +  chronic anemia            Dermatologic: The patient reports no recent rashes or chronic skin disorders            Rheumatologic: The patient reports no history of chronic arthritis, myalgias, arthralgias Recent Labs   Lab 12/05/19  1534 12/05/19 2011   * 153*   * 100*   CREATSERUM 2.86* 2.64*   GFRAA 21* 24*   GFRNAA 19* 20*   CA 9.0 9.1    141   K 4.8 4.3    107   CO2 30.0 32.0     Recent Labs   Lab 12/06/19  0524   RBC 3.21 free air. Electa Irons is a 5 cm segment of circumferential wall thickening involving the splenic flexure without diverticula with slight infiltration of the fat.    The differential would include colonic neoplasm, short segment colitis, diverticulitis.  Colonic consultation, we will follow the patient with you.   Preston Lopez, APRN  9:32 AM  12/6/2019  [facility] Gastroenterology  [phone]    GI attending addendum:    I have personally seen and examined this patient and agree with above nurse practitioner's as

## 2019-12-06 NOTE — CM/SW NOTE
griselda spoke to dtr Miky who states pt resides at [facility]. Pt was admitted from [facility] but she anticipates they will send pt back home to [facility] with hospice.  griselda emailed hospice list at her request. griselda to follow for further dc planning

## 2019-12-07 NOTE — PHYSICAL THERAPY NOTE
PHYSICAL THERAPY QUICK EVALUATION - INPATIENT    Room Number: 2341/0519-O  Evaluation Date: 12/7/2019  Presenting Problem: weakness, low hemoglobin  Physician Order: PT Eval and Treat     Pt is 80year old male admitted on 12/5/2019 from [facility] with c/o wea TONSILLECTOMY         HOME SITUATION  Type of Home: Assisted living facility      Stairs to Enter : 0     Stairs to Bedroom: 0       Lives With: Staff 24 hours  Drives: No  Patient Owned Equipment: (wheelchair)  Patient Regularly Uses: Home O2(2L)    Prior Assistance: Not tested           Stoop/Curb Assistance: Not tested       Skilled Therapy Provided: RN cleared pt for session. RN states pt had been given pain medication and so far was working. Pt received slightly R sidelying in bed with HOB elevated.  Pt level surfaces Unable before admission

## 2019-12-07 NOTE — CONSULTS
[address] [facility] Julian Wright Patient Status:  Inpatient    1929 MRN [id number]   [facility] 8NE-A Attending Jony Figueroa MD   Hosp Day # 2 PCP Marnell Eisenmenger, MD     Reason for Consultation:   Anemia / +Troponi yrs ago   • Traumatic subarachnoid hemorrhage (HCC)    • Visual impairment     glasses       Social History:   Smoking:  None  Alcohol:  None    Family History:   No family history of premature arthrosclerotic heart disease     Medications:   Scheduled: tablet, Rfl: 1  omega-3 fatty acids 1000 MG Oral Cap, Take 1,000 mg by mouth daily. , Disp: , Rfl:   metFORMIN HCl 500 MG Oral Tab, Take 500 mg by mouth nightly., Disp: , Rfl:   torsemide 20 MG Oral Tab, Take 2 tablets (40 mg total) by mouth BID (Diuretic). normal S1 and S2. No S3 or S4. No murmurs, rubs, or gallops. PMI is non-displaced with a normal apical impulse. Lungs: Coarse ascultation bilaterally. No focal rhonchi or wheezes. Few rales. Good air movement is noted throughout all lung fields.   Ab recent past hospitalization               -Denies ACS Sx, though Hx is suboptimal    -10/19 stress performed due to recently elevated Troponin and with no provoked ischemia, though past inferior infarct  4.  HTN  5.  HL  6.  DM  7.  SAH 10/19  8.  CRI  9.

## 2019-12-07 NOTE — PLAN OF CARE
Neuro: AOx4  Resp: Anterior breath sounds diminished/posterior breath sounds w/crackles. Weaned to 4 L/min HFNC. Cardiac: NSR. Pedal pulses +1. BLE pitting edema +1. GI: No BM since admission. Excellent appetitie.   : WNL  Skin: Skin tear reported on sa Identify cognitive and physical deficits and behaviors that affect risk of falls.   - [place] fall precautions as indicated by assessment.  - Educate pt/family on patient safety including physical limitations  - Instruct pt to call for assistance with act

## 2019-12-07 NOTE — PLAN OF CARE
@7333- Dr. Lisa Rangel paged for hgb 7.2, no orders at this time    IVF infusing at 20 ml/hr  A/O x 4- fatigue/drowsy  Oxygen increased to 5 liters from 3 liters with sleep to maintain oxygen sats,   NSR  Pain to back managed with tylenol q 6 prn, lidode

## 2019-12-07 NOTE — PLAN OF CARE
Problem: MUSCULOSKELETAL - ADULT  Goal: Maintain proper alignment of affected body part  Description  INTERVENTIONS:  - Support and protect limb and body alignment per provider's orders  - Instruct and reinforce with patient and family use of appropriate

## 2019-12-07 NOTE — PROGRESS NOTES
[facility]  Progress Note     Ellen Hernandez Patient Status:  Inpatient    1929 MRN [id number]   [facility] 8NE-A Attending Danii Anders MD   Hosp Day # 2 PCP Jen Huddleston MD     Chief Complaint: Anemia    S: Comfortable a of 2.64 mg/dL (H)). Recent Labs   Lab 12/05/19  1534   PTP 14.6   INR 1.09       Recent Labs   Lab 12/05/19  1534 12/05/19 2011 12/05/19  2348   TROP 0.234* 0.242* 1.070*            Imaging: Imaging data reviewed in Epic.     Medications:   • ferrous puckett

## 2019-12-08 PROBLEM — C18.9 COLON CANCER (HCC): Status: ACTIVE | Noted: 2019-01-01

## 2019-12-08 NOTE — PLAN OF CARE
BSE completed; education provided.     Problem: Impaired Swallowing  Goal: Minimize aspiration risk  Description  Interventions:  - Patient should be alert and upright for all feedings (90 degrees preferred)  - Offer food and liquids at a slow rate  - [place]

## 2019-12-08 NOTE — CM/SW NOTE
ARMIN Huang called to say that family is ready to proceed with hospice care for pt. Pt most likely will be GIP hospice as pt is currently having severe pain and now not eating. Pt has colon cancer with possible mets to spine.   Paged [facility] at [phone]

## 2019-12-08 NOTE — SLP NOTE
ADULT SWALLOWING EVALUATION    ASSESSMENT    ASSESSMENT/OVERALL IMPRESSION:  History of Present Illness: Presenting Problem: weakness, low hemoglobin. Orders received for ST eval and treat.  Pt is 80year old male admitted on 12/5/2019 from residential with c/o wea risk of suboptimal nutrition/hydration due to current pain level (back and abdomen). He would benefit from a modified diet for pleasure as tolerated; barrier at this time is pain. Family/patient does not want tube feedings for supplemental nutrition.  Dixie Avendaño \"lower back\"   • Cataract    • Cellulitis    • CKD (chronic kidney disease)    • Congestive heart disease (HCC)    • COPD (chronic obstructive pulmonary disease) ([facility]    • Coronary atherosclerosis    • Diabetes ([facility]    • Essential hypertension    • Gou functional- poor fit- too loose)  Symmetry: Reduced right facial  Strength: Reduced right lingual;Reduced left lingual     Range of Motion: Reduced right facial  Rate of Motion: (DNT)    Voice Quality: Weak  Respiratory Status: Supplemental O2;Nasal cannul

## 2019-12-08 NOTE — PLAN OF CARE
Neuro: Patient with decreased LOC this AM. Febrile. Patient alert to name only. Fatigue and lethargic. Poor attention. Difficulty to remain awake.  Dr. Yuan Payment notified, no orders received, and instructed to page Dr. Chele Perdomo at 8 AM. Dr. Chele Perdomo paged at Monitor for opioid side effects  - Notify MD/LIP if interventions unsuccessful or patient reports new pain  - Anticipate increased pain with activity and pre-medicate as appropriate  Outcome: Not Progressing     Problem: SAFETY ADULT - FALL  Goal: Free fro function  Description  INTERVENTIONS:  - Assess patient stability and activity tolerance for standing, transferring and ambulating w/ or w/o assistive devices  - Assist with transfers and ambulation using safe patient handling equipment as needed  - Ensure

## 2019-12-08 NOTE — HOSPICE RN NOTE
Met with family, consents signed, discussed POC, comfort measures only desired by family and patient, and Dilaudid drip to manage pain/sob. Orders obtained from Dr Prashant Espinoza and Dr Geraldine Vail (hospice medical director), diagnosis: colon cancer.  Pt to be MARTHA ho

## 2019-12-08 NOTE — PLAN OF CARE
Received bedside report on this Pt., at 1605. Pt., resting in bed, drowsy, A&O to self and place. Pt., is in SR on Tele monitor, sats greater than 92% on 5L Oxygen per HFNC. PRN Dildaudid administered per Pt., and family request for pain.   Pt's son and

## 2019-12-08 NOTE — PROGRESS NOTES
[facility]  Progress Note     Mariza Shin Patient Status:  Inpatient    1929 MRN [id number]   [facility] 8NE-A Attending Cosmo Willis MD   Hosp Day # 3 PCP Lakeshia Barron MD     Chief Complaint: Anemia    S: Febrile overn BILT 0.2  --   --  0.2   TP 6.7  --   --  6.5       Estimated Creatinine Clearance: 17 mL/min (A) (based on SCr of 2.8 mg/dL (H)).     Recent Labs   Lab 12/05/19  1534   PTP 14.6   INR 1.09       Recent Labs   Lab 12/05/19  1534 12/05/19 2011 12/05/19  2

## 2019-12-08 NOTE — PROGRESS NOTES
[address] [facility] Julian Wright Patient Status:  Inpatient    1929 MRN [id number]   [facility] 8NE-A Attending Dmitriy Bain MD   Hosp Day # 3 PCP Shannan Abarca MD     Reason for Consultation:   Anemia / +Troponi Stroke [facility])     pt reports 20 yrs ago   • Traumatic subarachnoid hemorrhage (HCC)    • Visual impairment     glasses       Social History:   Smoking:  None  Alcohol:  None    Family History:   No family history of premature arthrosclerotic heart disease patch, Rfl: 0  Clopidogrel Bisulfate 75 MG Oral Tab, Take 1 tablet (75 mg total) by mouth daily. , Disp: 90 tablet, Rfl: 1  omega-3 fatty acids 1000 MG Oral Cap, Take 1,000 mg by mouth daily. , Disp: , Rfl:   metFORMIN HCl 500 MG Oral Tab, Take 500 mg by yasmani Supple. No JVD. Carotids 2+ and equal in symmetric fashion. No bruits are noted. Cardiac: Regular rate and rhythm. There is a normal S1 and S2. No S3 or S4. No murmurs, rubs, or gallops. PMI is non-displaced with a normal apical impulse.   Lungs: C etiology  2.   CAD               -S/P remote CABG               -S/P past remote MI               -S/P PCI / HIRAM 10/18  3.  +Troponin     -Recurrent with recent past hospitalization               -Denies ACS Sx, though Hx is suboptimal    -10/19 stress perf

## 2019-12-08 NOTE — PLAN OF CARE
Assumed care for pt at 23:30 on 12/7  DNR/DNI document verified in Epic    Pt drowsy but arousable, unable to answer orientation questions. Received IV dilaudid 22:46, pt moans with each movement (Ex.  Lifting arm to apply BP cuff)  Sleeping appears comfort patient reports new pain  - Anticipate increased pain with activity and pre-medicate as appropriate  Outcome: Progressing     Problem: SAFETY ADULT - FALL  Goal: Free from fall injury  Description  INTERVENTIONS:  - Assess pt frequently for physical needs transferring and ambulating w/ or w/o assistive devices  - Assist with transfers and ambulation using safe patient handling equipment as needed  - Ensure adequate protection for wounds/incisions during mobilization  - Obtain PT/OT consults as needed  - Adv

## 2019-12-09 NOTE — PROGRESS NOTES
Assumed care at 0730. Patient is hospice status. No telemetry. dilaudid gtt infusing 1mg/hr. Patient nonverbal, patient appears comfortable, respirations 16 per minute. Apical sounds regular 81 per minute. Will continue to monitor.      5681: Patient with n

## 2019-12-09 NOTE — SPIRITUAL CARE NOTE
chp present this date. Pt passed peacefully. Emotional/spiritual support provided to dtr Shirly Rinne at bedside.

## 2019-12-09 NOTE — PLAN OF CARE
Assumed care for pt at 19:30 on 12/8    Unable to assess orientation, pt not responding. Moaning and grimacing, dilaudid gtt increased to 1mg/hr. Pt appears comfortable, no longer moaning at rest.   Now on 8 L HF, spO2 98%  Dark stool passed.  Condom vanessa

## 2019-12-09 NOTE — PROGRESS NOTES
19 0919   Clinical Encounter Type   Visited With Patient and family together  (Son)   Patient's Supportive Strategies/Resources Patient's daughter in law is contacting patient's [employer] in [place] for support and  service planning.  Patient's

## 2019-12-09 NOTE — DISCHARGE SUMMARY
[facility] HOSPITALIST  DISCHARGE SUMMARY     Mariza Shin Patient Status:  Inpatient    1929 MRN [id number]   [facility] 8NE-A Attending No att. providers found   Hosp Day # 3 PCP Lakeshia Barron MD     Date of Admission: 2019  Jourdan significant findings and recommendations (brief descriptions):  • n/a    Lab/Test results pending at Discharge:   · n/a    Consultants:  • GI, Cardiology    Discharge Medication List:     Discharge Medications      You have not been prescribed any medicati

## 2019-12-09 NOTE — HOSPICE RN NOTE
Notified of patient having passed away. Visited family to provide support. Condolences offered. Son expressed appreciation of support of hospice, \"you guys were great\".

## 2019-12-10 NOTE — DISCHARGE SUMMARY
[facility] HOSPITALIST  DISCHARGE SUMMARY     Maryse Raza Patient Status:  Inpatient    1929 MRN [id number]   [facility] 8NE-A Attending No att. providers found   Hosp Day # 1 PCP Giana Amaro MD     Date of Admission: 2019  Jourdan · n/a    Consultants:  • Hospice    Discharge Medication List:     Discharge Medications      You have not been prescribed any medications.          [facility] reviewed: n/a    ----------------------------------------------------------aditya Davis DO 12/

## (undated) NOTE — IP AVS SNAPSHOT
[address]            (For Outpatient Use Only) Initial Admit Date: 11/14/2019   Inpt/Obs Admit Date: Inpt: 11/14/19 / Obs: N/A   Discharge Date:    Sigrid Binet:  [de-identified]   MRN: [de-identified]   CSN: [id number]   CEID: [id number] Payor:  Plan:    Group Number:  Insurance Type:    Subscriber Name:  Subscriber :    Subscriber ID:  Pt Rel to Subscriber:    Hospital Account Financial Class: Medicare    2019

## (undated) NOTE — IP AVS SNAPSHOT
Patient Demographics     Address  [address] [place] [address] Phone  [phone] (Home) *Preferred*  [phone] [facility])      Emergency Contact(s)     Name Relation Home Work Arlene Daughter [phone] 4. Some exercise and activity is important to help keep your heart functioning and strong. Unless instructed not to exercise, you may walk at a slow to moderate pace for 10-15 minutes 2-3 days per week to start.  Pace your activity to prevent shortness of b Dank Hansen MD         atorvastatin 20 MG Tabs  Commonly known as:  LIPITOR      Take 1 tablet (20 mg total) by mouth nightly.    Henry Rich MD         cholecalciferol 1000 units Caps  Commonly known as:  DRISDOL      Take 1,000 Units by mouth 2 (two) Order ID Medication Name Action Time Action Reason Comments    [id number] Clopidogrel Bisulfate (PLAVIX) tab 75 mg 10/26/19 0830 Given      [id number] Metoprolol Succinate ER (Toprol XL) 24 hr tab 25 mg 10/25/19 1646 Given      [id number] Metoprolol Succinat Patient Weight  89 kg (196 lb 3.4 oz)      Lab Results Last 24 Hours    No matching results found     Microbiology Results (All)     None         H&P - H&P Note      H&P signed by Loi Kramer MD at 10/22/2019  2:35 PM  Version 1 of 1    Author:  Nicanor Angel, smokeless tobacco. He reports previous alcohol use. He reports that he does not use drugs. Allergies:  No Known Allergies    Home Medications:  aspirin 81 MG Oral Tab EC, Take 1 tablet (81 mg total) by mouth daily. , Disp: 30 tablet, Rfl: 0, 10/21/2019 a /50 (BP Location: Left arm)   Pulse 55   Temp 98.9 °F (37.2 °C) (Oral)   Resp 20   Wt 205 lb 0.4 oz (93 kg)   SpO2 99%   BMI 31.17 kg/m²[RK. 3]   General appearance: alert, appears stated age and cooperative  Head: Normocephalic, without obvious abnor Attribution Key    RK. 1 - Korina Munoz MD on 10/22/2019  6:17 AM  PRIYA. 2 - Korina Munoz MD on 10/22/2019  2:02 PM  PRIYA. 3 - Korina Munoz MD on 10/22/2019  2:25 PM                        Consults - MD Consult Notes      Consults signed by Preston Siegel Past Medical History:   Past Medical History:   Diagnosis Date   • Back problem     \"lower back\"   • Cataract    • Congestive heart disease ([facility]    • Coronary atherosclerosis    • Diabetes [facility]    • Gout    • Heart attack ([facility]    • High blood pressu tablet, Rfl: 0  Insulin Aspart Pen 100 UNIT/ML Subcutaneous Solution Pen-injector, Inject 10 Units into the skin TID CC and HS., Disp: 1 pen, Rfl: 0  acetaminophen 325 MG Oral Tab, Take 2 tablets (650 mg total) by mouth every 4 (four) hours as needed. , Dis normoactive. No guarding or rebound. Extremities: Extremities demonstrate 2+ peripheral edema. No cyanosis or clubbing of the digits is appreciated. Femoral, Dorsalis Pedis, and Posterior Tibialis  pulses are 2+ and equal in a symmetric fashion.   Francisco Mean gradient (S): 10mm Hg. Indexed valve area (VTI): 1.09cm^2/m^2.  6. Mitral valve: Mildly calcified annulus.  There was trivial regurgitation.         Assessment:  1.  Acute on chronic diastolic CHF  2.  CAD               -S/P remote CABG              M.D.,SUGEY Consulting:  Johanna Mcgowan Referring:   Sadie Garcia M.D.,SUGEY Sen --------------------------------------------------------------------------- - History/Indications:  Syncope, + troponin. ----------------------------------------------- --------------------------------------------------------------------------- - * Left ventricle: The cavity size was normal. Wall thickness was mildly increased. Systolic function was normal. The estimated ejection fraction was 55-60%.  There was no diagnos was no pericardial effusion. Aorta: Aortic root: The aortic root was not dilated. Pulmonary artery:   Not well visualized. The main pulmonary artery was normal-sized. Systemic veins: Inferior vena cava:  The vessel was normally collapsible and normal in si ---------  Aortic valve area, VTI                       2.25  cm^2     ---------  Aortic valve area/bsa, VTI                   1.09  cm^2/m^2 ---------  Aortic valve area, peak velocity             2.11  cm^2     ---------  Aortic valve area/bsa, peak velo Jeanette Diaz M.D.,F.A.C.C. 10/02/2019 14:46     Result Date: 10/2/2019                                                     *[facility]* concomitant abnormal relaxation and    increased filling pressure - grade 2 diastolic dysfunction. 2. Right ventricle: The cavity size was normal. Wall thickness was normal.    Systolic function was normal. Systolic pressure was mildly increased.  3. Left a was within the normal range. There was no stenosis. No regurgitation. Valve area (VTI): 2.25cm^2. Indexed valve area (VTI): 1.09cm^2/m^2. Valve area (Vmax): 2.11cm^2. Indexed valve area (Vmax): 1.02cm^2/m^2. Mean gradient (S): 10mm Hg.  Peak gradient Reference  IVS thickness, ED, PLAX              (H)     1.3   cm       0.6 - 1.0   LVOT                                         Value          Reference  LVOT area                                    3.46  cm^2     ---------  LVOT ID Mitral A-wave peak velocity                  1.03  m/sec    ---------  Mitral peak gradient, D                      6     mm Hg    ---------  Mitral E/A ratio, peak                       1.2            ---------   Systemic veins • Congestive heart disease ([facility]    • Coronary atherosclerosis    • Diabetes (HCC)    • Gout    • Heart attack (HCC)    • High blood pressure    • High cholesterol    • Muscle weakness     right sided   • Shortness of breath    • Stroke [facility])     pt report -   Moving to and from a bed to a chair (including a wheelchair)?: None   -   Need to walk in hospital room?: None   -   Climbing 3-5 steps with a railing?: A Little[EP.2]       AM-PAC Score:[EP.1]  Raw Score: 23   Approx Degree of Impairment: 11.2%   Db Villareal Patient was able to transfer At previous, functional level  Safely and independently   Patient able to ambulate on level surfaces At previous, functional level  Safely and independently[EP.1]        Attribution English    EP. 1 Bianca Luong PT on 10/25/ Principal Problem:    Acute congestive heart failure, unspecified heart failure type [facility])  Active Problems:    Congestive heart failure [facility])      Past Medical History  Past Medical History:   Diagnosis Date   • Back problem     \"lower back\"   • Yanet Communication:[MS.1] clear speech[MS. 2]    Behavioral/Emotional/Social:[MS.1] pleasant[MS. 2]    RANGE OF MOTION AND STRENGTH ASSESSMENT  Upper extremity ROM is within functional limits     Upper extremity strength is within functional limits     COORDINATI Patient is a 80year old male admitted on 10/21/2019 for[MS. 1] CHF[MS.3]. Complete medical history and occupational profile noted above. Functional outcome measures completed include[MS. 1] AM-PAC, ROM, and MMT[MS.3].  In this OT evaluation patient presents Patient will perform lower body dressing:  with supervision  Patient will perform toileting: with supervision[MS. 3]      UE Exercise Program Goal  Patient will be[MS. 1] independent[MS. 3] with[MS.1] bilateral AROM[MS.3] HEP (home exercise program). [MS.1]

## (undated) NOTE — IP AVS SNAPSHOT
Patient Demographics     Address  [address] [place] [address] Phone  [phone] (Home) *Preferred*  [phone] [facility])      Emergency Contact(s)     Name Relation Home Work Arlene Daughter [phone] cholecalciferol 1000 units Caps  Commonly known as:  DRISDOL  Next dose due: Tomorrow 10/8      Take 1,000 Units by mouth 2 (two) times daily. ferrous sulfate 325 (65 FE) MG Tbec  Next dose due:   Tomorrow 10/8      Take 1 tablet (325 mg total) by These medications were sent to [address] [place] [address], [address], [address] [facility] [place] [address]    Phone:  [phone]   ferrous sulfate 325 (65 FE) MG Tbec  predniSONE 10 MG Tabs     Please pic [id number] Insulin Aspart Pen (NOVOLOG) 100 UNIT/ML flexpen 10 Units 10/07/19 1722 Given            LEFT UPPER ARM     Order ID Medication Name Action Time Action Reason Comments    [id number] Insulin Aspart Pen (NOVOLOG) 100 UNIT/ML flexpen 10 Units 10/07/ Order Status:  Completed Lab Status:  Final result Updated:  10/02/19 0725    Specimen:  Urine, clean catch      Urine Culture >100,000 CFU/ML Enterococcus species not VRE    MRSA Culture Only Once [[id number]] Collected:  09/30/19 0240    Order Status:  C :  Rigoberto Barrera MD (Physician)       [facility] Patient Status:  Inpatient    1929 MRN [id number]   [facility] 6NE-A Attending Rigoberto Barrera MD   Hosp Day # 0 PCP Baldo Monaco, MD Estill Blizzard is long-term current use of insulin (HCC)      Blood pressure 145/45, pulse 67, temperature 99.2 °F (37.3 °C), temperature source Temporal, resp. rate (!) 27, weight 202 lb 13.2 oz (92 kg), SpO2 94 %. [RK.1]       Subjective:  Symptoms:  ([RK.2]80 year old [de-identified] Neurological: Patient is[RK.2] alert[RK.4] and[RK.2] oriented to person, place and time[RK.4]. Skin:[RK.2]  Warm[RK. 4]. ([RK.2]Forehead hematoma[RK. 4])        Assessment:    Condition:[RK.2] In stable condition[RK.4]. [RK.2]  Unchanged[RK. 4].    ([RK.2]P nephropathy. Urine sediment is bland historically with isolated low-grade proteinuria consistent with above and does not suggest an underlying glomerulopathy. Evaluation for other etiologies has been unrevealing.  PLAN- no further w/u- will follow    2) h reports that he quit smoking about 3 years ago. His smoking use included cigarettes. He has a 20.00 pack-year smoking history. He has never used smokeless tobacco. He reports that he drank alcohol. He reports that he does not use drugs.     Allergies:  No PRN **OR** dextrose 50 % injection 50 mL, 50 mL, Intravenous, Q15 Min PRN **OR** glucose (DEX4) oral liquid 30 g, 30 g, Oral, Q15 Min PRN **OR** Glucose-Vitamin C (DEX-4) chewable tab 8 tablet, 8 tablet, Oral, Q15 Min PRN  •  Insulin Aspart Pen (NOVOLOG) 1 K 4.9 10/04/2019     10/04/2019    CO2 27.0 10/04/2019     10/04/2019    CA 8.6 10/04/2019    ALB 2.7 10/04/2019    ALKPHO 63 10/04/2019    BILT 0.3 10/04/2019    TP 6.2 10/04/2019    AST 16 10/04/2019    ALT 19 10/04/2019    PGLU 284 10/04/2 - Procedure information:  A transthoracic complete 2D study was performed. Additional evaluation included M-mode, complete spectral Doppler, and color Doppler. Inpatient. Room 7624. This was a routine echocardiographic study.  Transthoracic echocardiogr motion abnormalities. Features are consistent with a pseudonormal left ventricular filling pattern, with concomitant abnormal relaxation and increased filling pressure - grade 2 diastolic dysfunction.  Left atrium:  The left atrium was moderately to [employer] --------------------------------------------------------------------------- - Measurements  Left ventricle                               Value          Reference  LV ID, ED, PLAX                              5.2   cm       4.2 - 5.9  LV ID, ES, PLAX Reference  Aortic root ID, ED                           3.5   cm       <4.2   Left atrium                                  Value          Reference  LA ID, A-P, ES                       (H)     5.9   cm       3.0 - 4.0  LA ID/bsa, A-P Ceasar, [address] [place] [address]                                                               [phone] ---------------------------------------------------------------------------- Transthoracic Echocardiogram Name:Jo Wright Date: 10/02/2019 :  19 dilated. 5. Aortic valve: Trileaflet; mildly thickened, mildly calcified leaflets. Thickening, consistent with sclerosis. Transvalvular velocity was within    the normal range. There was no stenosis. Peak velocity (S): 2.1m/sec.     Mean gradient (S): 10 Structurally normal valve. Leaflet separation was normal.  Doppler:  Transvalvular velocity was within the normal range. There was no evidence for stenosis. There was trivial regurgitation. Pulmonic valve:   Not well visualized.   Structurally normal valv ---------   Aortic valve                                 Value          Reference  Aortic valve peak velocity, S                2.1   m/sec    ---------  Aortic valve mean velocity, S                1.46  m/sec    ---------  Aortic valve VTI, S Estimated CVP                                5     mm Hg    ---------   Pulmonic valve                               Value          Reference  Pulmonic regurg velocity, ED                 1.42  m/sec    ---------  Pulmonic regurg gradient, ED Problem List[SP.1]  Principal Problem:    Traumatic hemorrhage of cerebrum with loss of consciousness, unspecified laterality, initial encounter ([facility]  Active Problems:    Traumatic hemorrhage of cerebrum with loss of consciousness ([facility]    Hypertension, u Dynamic Sitting: Fair           Static Standing: Poor +  Dynamic Standing: Poor +[SP.2]    ACTIVITY TOLERANCE                         O2 WALK[SP.1]  SPO2 on Room Air at Rest: 87     SPO2 Ambulation on Oxygen: 92  Ambulation oxygen flow (liters per minute): during amb, but pt denies SOB. Pt on 2LO2 in chair, O2sat 92%, removed for therex, O2 to 87%, returned O2. Utilized 3LO2 during amb, pt O2sat 92%.   Pt wished to remain up in chair, instructed to also amb with nsg staff, discussed with Bird golden, also assistance level: supervision      Goal #3 Patient is able to ambulate 150 feet with assist device: walker - rolling at assistance level: supervision      Goal #4     Goal #5     Goal #6     Goal Comments: Goals established on 9/30/2019, all goals are ongo memory. Given support provided prior to admission, suspect there were some deficits present.   Discussed above with the patient's daughter over the phone and she reported she perceived no new cognitive deficits but did state that she would monitor her fath nonspecific but most consistent with chronic   small vessel ischemic change. Left frontoparietal lobe encephalomalacia changes. CONCLUSION:  1.  No significant change when compared to most recent noncontrast CT of the brain performed 9/29/2019 as detailed Patient seen for cognitive communication assessment per protocol. Patient alert and up in bedside chair. Patient denies any change in cognitive communication status. He lives in independent living where he has breakfast and dinner taken care of for him. present, every 1 of the sternotomy wires is   broken.   Dictated by: Dorene Segovia MD on 10/02/2019 at 8:15       Approved by: Dorene Segovia MD         Brain CT from 9/30/19 revealed:  INTRACRANIAL:  Persistent small amount of acute subarachnoid hemorrh

## (undated) NOTE — IP AVS SNAPSHOT
Patient Demographics     Address  [address] [place] [address] Phone  [phone] (Home) *Preferred*  [phone] [facility])      Emergency Contact(s)     Name Relation Home Work Melissai Daughter [phone]    S functioning and strong. Unless instructed not to exercise, you may walk at a slow to moderate pace for 10-15 minutes 2-3 days per week to start. Pace your activity to prevent shortness of breath or fatigue.  Stop exercise if you develop chest pain, lighthea Next dose due:  11/23/19 9am      Take 1 tablet (81 mg total) by mouth daily. Kuldeep Cee MD         atorvastatin 40 MG Tabs  Commonly known as:  LIPITOR  Next dose due:  11/22/19 9pm      Take 1 tablet (40 mg total) by mouth nightly.    [place], [place] Take 1,000 mg by mouth daily. Potassium Chloride ER 20 MEQ Tbcr  Commonly known as:  K-DUR M20  Next dose due:  11/23/19 9am      Take 20 mEq by mouth 2 (two) times daily.           torsemide 20 MG Tabs  Commonly known as:  DEMADEX  Next dose due: [id number] hydrALAzine HCl (APRESOLINE) tab 50 mg 11/22/19 0540 Given      [id number] hydrALAzine HCl (APRESOLINE) tab 50 mg 11/22/19 1010 Given      [id number] torsemide (DEMADEX) tab 40 mg 11/21/19 1735 Given      [id number] torsemide (DEMADEX) tab 40 mg 11 MAGNESIUM [[id number]] (Abnormal)  Resulted: 11/22/19 0951, Result status: Final result   Ordering provider:  Candance Chess, MD  11/22/19 1192 Resulting lab:  [facility]    Specimen Information    Type Source Collected On   Blood — 11/20/19 5395 Order Status:  Completed Lab Status:  Final result Updated:  11/19/19 7839    Specimen:  Blood,peripheral      Blood Culture Result Streptococcus sanguinis     Blood Culture Smear Gram positive cocci in chains    Blood Culture FREQ X 2 [[id number]]  (Abnor Past Medical History:   Diagnosis Date   • Anemia    • Back problem     \"lower back\"   • Cataract    • Cellulitis    • Congest[facility] ([facility]    • COPD (chronic obstructive pulmonary disease) (HCC)    • Coronary atherosclerosis    • Diabetes ([facility] Clopidogrel Bisulfate 75 MG Oral Tab, Take 75 mg by mouth., Disp: , Rfl: , 11/14/2019 at Unknown time  aspirin 81 MG Oral Tab EC, Take 1 tablet (81 mg total) by mouth daily. , Disp: 30 tablet, Rfl: 0, 11/14/2019 at Unknown time  ferrous sulfate 325 (65 FE) Eyes:    PERRL, conjunctiva/corneas clear, EOM's intact, fundi     benign, both eyes        Ears:    Normal TM's and external ear canals, both ears   Nose:   Nares normal, septum midline, mucosa normal, no drainage    or sinus tenderness   Throat:   Lips, Hypertension, unspecified type     Syncope, vasovagal     Elevated troponin     Subarachnoid hemorrhage following injury with brief loss of consciousness but without open intracranial wound ([facility]     Type 2 diabetes mellitus with stage 3 chronic kidney Consult Orders    1.  Consult to Neurology [[id number]] ordered by Abi Aparicio MD at 19 1400              Neurology H&P    [facility] Patient Status:  Inpatient    1929 MRN [id number]   [facility] 2NE-A Attending Kayla Broderick Acute congestive heart failure, unspecified heart failure type (HCC)     Chronic diastolic heart failure (HCC)     Coronary artery disease involving native coronary artery of native heart without angina pectoris     Acute on chronic congestive heart kali (36.8 °C), temperature source Oral, resp. rate 18, height 68\", weight 188 lb 7.9 oz (85.5 kg), SpO2 95 %.     Gen: Awake and in no apparent distress  HEENT: moist mucus membranes  Neck: Supple  Cardiovascular: Regular rate and rhythm, no murmur  Pulm: CTAB 2. Findings most consistent with chronic small vessel ischemic change within the deep white matter. 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  Neurology was consulted as patient reports R hand c through 11/22/2019  4:10 PM)      Physical Therapy Note signed by Rayna Cho PT at 11/22/2019  3:54 PM  Version 1 of 1    Author:  Rayna Cho PT Service:  Rehab Author Type:  Physical Therapist    Filed:  11/22/2019  3:54 PM Date of Service:  11/22/2 • Traumatic subarachnoid hemorrhage (HCC)    • Visual impairment     glasses       Past Surgical History  Past Surgical History:   Procedure Laterality Date   • CABG     • CATARACT     • CATH PERCUTANEOUS  TRANSLUMINAL CORONARY ANGIOPLASTY  11/2018    w/st Gait Assistance: Dependent assistance(actual min assist)  Distance (ft): 20  Assistive Device: Rolling walker  Pattern: R Foot flat;L Foot flat(R LE ER)  Stoop/Curb Assistance: Not tested  Comment : scores based on FIM definition per dept policy    Skilled Goal #2 Patient is able to demonstrate transfers Sit to/from Stand at assistance level: supervision      Goal #3 Patient is able to ambulate 150 feet with assist device: walker - rolling at assistance level: supervision      Goal #4     Goal #5     Goal #6

## (undated) NOTE — IP AVS SNAPSHOT
[address]            (For Outpatient Use Only) Initial Admit Date: 9/29/2019   Inpt/Obs Admit Date: Inpt: 9/30/19 / Obs: N/A   Discharge Date:    Jayson Christine:  [de-identified]   MRN: [de-identified]   CSN: [id number]   CEID: [id number] Subscriber Name:  Mary Espitia :    Subscriber ID:  Pt Rel to Subscriber:    Hospital Account Financial Class: Medicare    2019